# Patient Record
Sex: MALE | Race: WHITE | NOT HISPANIC OR LATINO
[De-identification: names, ages, dates, MRNs, and addresses within clinical notes are randomized per-mention and may not be internally consistent; named-entity substitution may affect disease eponyms.]

---

## 2020-08-19 ENCOUNTER — TRANSCRIPTION ENCOUNTER (OUTPATIENT)
Age: 40
End: 2020-08-19

## 2020-08-23 ENCOUNTER — TRANSCRIPTION ENCOUNTER (OUTPATIENT)
Age: 40
End: 2020-08-23

## 2021-04-22 ENCOUNTER — TRANSCRIPTION ENCOUNTER (OUTPATIENT)
Age: 41
End: 2021-04-22

## 2021-04-22 ENCOUNTER — EMERGENCY (EMERGENCY)
Facility: HOSPITAL | Age: 41
LOS: 0 days | Discharge: HOME | End: 2021-04-22
Attending: STUDENT IN AN ORGANIZED HEALTH CARE EDUCATION/TRAINING PROGRAM
Payer: COMMERCIAL

## 2021-04-22 VITALS
SYSTOLIC BLOOD PRESSURE: 139 MMHG | DIASTOLIC BLOOD PRESSURE: 94 MMHG | HEART RATE: 76 BPM | RESPIRATION RATE: 16 BRPM | OXYGEN SATURATION: 98 % | TEMPERATURE: 98 F

## 2021-04-22 VITALS
HEART RATE: 95 BPM | WEIGHT: 214.95 LBS | TEMPERATURE: 99 F | DIASTOLIC BLOOD PRESSURE: 99 MMHG | HEIGHT: 71 IN | OXYGEN SATURATION: 96 % | RESPIRATION RATE: 14 BRPM | SYSTOLIC BLOOD PRESSURE: 141 MMHG

## 2021-04-22 DIAGNOSIS — R07.89 OTHER CHEST PAIN: ICD-10-CM

## 2021-04-22 DIAGNOSIS — Z88.0 ALLERGY STATUS TO PENICILLIN: ICD-10-CM

## 2021-04-22 LAB
ALBUMIN SERPL ELPH-MCNC: 4.2 G/DL — SIGNIFICANT CHANGE UP (ref 3.3–5)
ALP SERPL-CCNC: 79 U/L — SIGNIFICANT CHANGE UP (ref 40–120)
ALT FLD-CCNC: 41 U/L — SIGNIFICANT CHANGE UP (ref 12–78)
ANION GAP SERPL CALC-SCNC: 4 MMOL/L — LOW (ref 5–17)
APTT BLD: 29.7 SEC — SIGNIFICANT CHANGE UP (ref 27.5–35.5)
AST SERPL-CCNC: 28 U/L — SIGNIFICANT CHANGE UP (ref 15–37)
BASOPHILS # BLD AUTO: 0.05 K/UL — SIGNIFICANT CHANGE UP (ref 0–0.2)
BASOPHILS NFR BLD AUTO: 0.8 % — SIGNIFICANT CHANGE UP (ref 0–2)
BILIRUB SERPL-MCNC: 0.4 MG/DL — SIGNIFICANT CHANGE UP (ref 0.2–1.2)
BUN SERPL-MCNC: 20 MG/DL — SIGNIFICANT CHANGE UP (ref 7–23)
CALCIUM SERPL-MCNC: 8.7 MG/DL — SIGNIFICANT CHANGE UP (ref 8.5–10.1)
CHLORIDE SERPL-SCNC: 107 MMOL/L — SIGNIFICANT CHANGE UP (ref 96–108)
CO2 SERPL-SCNC: 29 MMOL/L — SIGNIFICANT CHANGE UP (ref 22–31)
CREAT SERPL-MCNC: 1.1 MG/DL — SIGNIFICANT CHANGE UP (ref 0.5–1.3)
EOSINOPHIL # BLD AUTO: 0.13 K/UL — SIGNIFICANT CHANGE UP (ref 0–0.5)
EOSINOPHIL NFR BLD AUTO: 2 % — SIGNIFICANT CHANGE UP (ref 0–6)
GLUCOSE SERPL-MCNC: 110 MG/DL — HIGH (ref 70–99)
HCT VFR BLD CALC: 45.6 % — SIGNIFICANT CHANGE UP (ref 39–50)
HGB BLD-MCNC: 14.9 G/DL — SIGNIFICANT CHANGE UP (ref 13–17)
IMM GRANULOCYTES NFR BLD AUTO: 0.2 % — SIGNIFICANT CHANGE UP (ref 0–1.5)
INR BLD: 0.93 RATIO — SIGNIFICANT CHANGE UP (ref 0.88–1.16)
LYMPHOCYTES # BLD AUTO: 1.84 K/UL — SIGNIFICANT CHANGE UP (ref 1–3.3)
LYMPHOCYTES # BLD AUTO: 27.6 % — SIGNIFICANT CHANGE UP (ref 13–44)
MAGNESIUM SERPL-MCNC: 2 MG/DL — SIGNIFICANT CHANGE UP (ref 1.6–2.6)
MCHC RBC-ENTMCNC: 30.8 PG — SIGNIFICANT CHANGE UP (ref 27–34)
MCHC RBC-ENTMCNC: 32.7 GM/DL — SIGNIFICANT CHANGE UP (ref 32–36)
MCV RBC AUTO: 94.2 FL — SIGNIFICANT CHANGE UP (ref 80–100)
MONOCYTES # BLD AUTO: 0.6 K/UL — SIGNIFICANT CHANGE UP (ref 0–0.9)
MONOCYTES NFR BLD AUTO: 9 % — SIGNIFICANT CHANGE UP (ref 2–14)
NEUTROPHILS # BLD AUTO: 4.03 K/UL — SIGNIFICANT CHANGE UP (ref 1.8–7.4)
NEUTROPHILS NFR BLD AUTO: 60.4 % — SIGNIFICANT CHANGE UP (ref 43–77)
PLATELET # BLD AUTO: 248 K/UL — SIGNIFICANT CHANGE UP (ref 150–400)
POTASSIUM SERPL-MCNC: 4 MMOL/L — SIGNIFICANT CHANGE UP (ref 3.5–5.3)
POTASSIUM SERPL-SCNC: 4 MMOL/L — SIGNIFICANT CHANGE UP (ref 3.5–5.3)
PROT SERPL-MCNC: 7.6 GM/DL — SIGNIFICANT CHANGE UP (ref 6–8.3)
PROTHROM AB SERPL-ACNC: 10.8 SEC — SIGNIFICANT CHANGE UP (ref 10.6–13.6)
RBC # BLD: 4.84 M/UL — SIGNIFICANT CHANGE UP (ref 4.2–5.8)
RBC # FLD: 12.5 % — SIGNIFICANT CHANGE UP (ref 10.3–14.5)
SODIUM SERPL-SCNC: 140 MMOL/L — SIGNIFICANT CHANGE UP (ref 135–145)
TROPONIN I SERPL-MCNC: <0.015 NG/ML — SIGNIFICANT CHANGE UP (ref 0.01–0.04)
TSH SERPL-MCNC: 1.02 UU/ML — SIGNIFICANT CHANGE UP (ref 0.34–4.82)
WBC # BLD: 6.66 K/UL — SIGNIFICANT CHANGE UP (ref 3.8–10.5)
WBC # FLD AUTO: 6.66 K/UL — SIGNIFICANT CHANGE UP (ref 3.8–10.5)

## 2021-04-22 PROCEDURE — 80053 COMPREHEN METABOLIC PANEL: CPT

## 2021-04-22 PROCEDURE — 85610 PROTHROMBIN TIME: CPT

## 2021-04-22 PROCEDURE — 99285 EMERGENCY DEPT VISIT HI MDM: CPT

## 2021-04-22 PROCEDURE — 71045 X-RAY EXAM CHEST 1 VIEW: CPT

## 2021-04-22 PROCEDURE — 99284 EMERGENCY DEPT VISIT MOD MDM: CPT | Mod: 25

## 2021-04-22 PROCEDURE — 93005 ELECTROCARDIOGRAM TRACING: CPT

## 2021-04-22 PROCEDURE — 93010 ELECTROCARDIOGRAM REPORT: CPT

## 2021-04-22 PROCEDURE — 84443 ASSAY THYROID STIM HORMONE: CPT

## 2021-04-22 PROCEDURE — 71045 X-RAY EXAM CHEST 1 VIEW: CPT | Mod: 26

## 2021-04-22 PROCEDURE — 36415 COLL VENOUS BLD VENIPUNCTURE: CPT

## 2021-04-22 PROCEDURE — 85730 THROMBOPLASTIN TIME PARTIAL: CPT

## 2021-04-22 PROCEDURE — 85025 COMPLETE CBC W/AUTO DIFF WBC: CPT

## 2021-04-22 PROCEDURE — 83735 ASSAY OF MAGNESIUM: CPT

## 2021-04-22 PROCEDURE — 84484 ASSAY OF TROPONIN QUANT: CPT

## 2021-04-22 NOTE — ED STATDOCS - PROGRESS NOTE DETAILS
Patient seen and evaluated s/p workup, no acute findings concerning for acs, pain for greater than 2 weeks.  Reviewed with patient return precautions, PMD f/u, cardiology follow up -Anson Godoy PA-C

## 2021-04-22 NOTE — ED STATDOCS - PATIENT PORTAL LINK FT
You can access the FollowMyHealth Patient Portal offered by [facility] by registering at the following website: [email or web address]. By joining [email or web address]’s FollowMyHealth portal, you will also be able to view your health information using other applications (apps) compatible with our system.

## 2021-04-22 NOTE — ED ADULT NURSE NOTE - OBJECTIVE STATEMENT
pt presents to the ED with chest pain/tightness for about 1 week. reports occasional SOB. denies fevers, chills, nausea, vomiting.

## 2021-04-22 NOTE — ED STATDOCS - OBJECTIVE STATEMENT
42 y/o male with no significant PMHx presents to the ED c/o 1 month of constant substernal chest pain. Pt states when bends over and lifts head up chest pain worsens with heart racing sensation. Notes symptoms worsening x1 week, went to urgent care today and was sent to the ED for further eval. No Hx of cardiac disease. No other complaints at this time.

## 2021-04-22 NOTE — ED ADULT TRIAGE NOTE - CHIEF COMPLAINT QUOTE
c/o chest pain, sob, rapid heart rate when bending over for past 2 weeks, went to urgent care today and sent to ER, denies sick contact, fever or cough HX: denies

## 2021-04-22 NOTE — ED STATDOCS - CARE PROVIDER_API CALL
Cody Belle (DO)  Cardiology  [address] [place] [address]  Phone: [phone]  Fax: [phone]  Follow Up Time:

## 2021-04-22 NOTE — ED STATDOCS - ATTENDING CONTRIBUTION TO CARE
I, Trinidad Blair DO,  performed the initial face to face bedside interview with this patient regarding history of present illness, review of symptoms and relevant past medical, social and family history.  I completed an independent physical examination.  I was the initial provider who evaluated this patient. I have signed out the follow up of any pending tests (i.e. labs, radiological studies) to the ACP.  I have communicated the patient’s plan of care and disposition with the ACP.  The history, relevant review of systems, past medical and surgical history, medical decision making, and physical examination was documented by the scribe in my presence and I attest to the accuracy of the documentation.

## 2021-04-23 ENCOUNTER — NON-APPOINTMENT (OUTPATIENT)
Age: 41
End: 2021-04-23

## 2021-04-23 DIAGNOSIS — Z83.3 FAMILY HISTORY OF DIABETES MELLITUS: ICD-10-CM

## 2021-04-23 DIAGNOSIS — Z82.49 FAMILY HISTORY OF ISCHEMIC HEART DISEASE AND OTHER DISEASES OF THE CIRCULATORY SYSTEM: ICD-10-CM

## 2021-04-23 DIAGNOSIS — Z78.9 OTHER SPECIFIED HEALTH STATUS: ICD-10-CM

## 2021-04-23 DIAGNOSIS — R10.32 LEFT LOWER QUADRANT PAIN: ICD-10-CM

## 2021-04-23 DIAGNOSIS — Z83.49 FAMILY HISTORY OF OTHER ENDOCRINE, NUTRITIONAL AND METABOLIC DISEASES: ICD-10-CM

## 2021-04-23 DIAGNOSIS — J06.9 ACUTE UPPER RESPIRATORY INFECTION, UNSPECIFIED: ICD-10-CM

## 2021-04-29 ENCOUNTER — APPOINTMENT (OUTPATIENT)
Dept: FAMILY MEDICINE | Facility: CLINIC | Age: 41
End: 2021-04-29
Payer: COMMERCIAL

## 2021-04-29 VITALS
DIASTOLIC BLOOD PRESSURE: 90 MMHG | OXYGEN SATURATION: 98 % | TEMPERATURE: 97 F | HEART RATE: 80 BPM | WEIGHT: 215 LBS | SYSTOLIC BLOOD PRESSURE: 138 MMHG | BODY MASS INDEX: 30.1 KG/M2 | HEIGHT: 71 IN

## 2021-04-29 DIAGNOSIS — Z00.00 ENCOUNTER FOR GENERAL ADULT MEDICAL EXAMINATION W/OUT ABNORMAL FINDINGS: ICD-10-CM

## 2021-04-29 PROCEDURE — 36415 COLL VENOUS BLD VENIPUNCTURE: CPT

## 2021-04-29 PROCEDURE — 99396 PREV VISIT EST AGE 40-64: CPT | Mod: 25

## 2021-04-29 PROCEDURE — 99072 ADDL SUPL MATRL&STAF TM PHE: CPT

## 2021-04-29 NOTE — PLAN
[FreeTextEntry1] : Patient is advised to continue with his current diet and exercise, along with his current rx management. He  is also advised to make sure that he follows up with the ophthalmologist as he has not since his surgery in 2015, and dentist annually, also the dermatologist for routine skin checks, which he is now due for.  He is also advised to make sure that he  exercises when possible, and follows up for any medical issues that arise. His labs will be checked as noted above.\par \par ECG declined today.

## 2021-04-29 NOTE — HEALTH RISK ASSESSMENT
[With Family] : lives with family [Employed] : employed [College] : College [] :  [Sexually Active] : sexually active [Smoke Detector] : smoke detector [Carbon Monoxide Detector] : carbon monoxide detector [Safety elements used in home] : safety elements used in home [Seat Belt] :  uses seat belt [Sunscreen] : uses sunscreen [# Of Children ___] : has [unfilled] children [Feels Safe at Home] : Feels safe at home [Change in mental status noted] : No change in mental status noted [High Risk Behavior] : no high risk behavior [Reports changes in hearing] : Reports no changes in hearing [Reports changes in vision] : Reports no changes in vision [Reports changes in dental health] : Reports no changes in dental health [Guns at Home] : no guns at home [Travel to Developing Areas] : does not  travel to developing areas [TB Exposure] : is not being exposed to tuberculosis [Caregiver Concerns] : does not have caregiver concerns [ColonoscopyDate] : never [FreeTextEntry2] :

## 2021-04-29 NOTE — HISTORY OF PRESENT ILLNESS
[FreeTextEntry1] : routine PE [de-identified] : Patient is currently utd with his Tdap, and is due in 2024.  He is also utd with the dentist, and he has not followed up with ophtho since he had LASIK surgery done.  His last skin check was last year, but will be rescheduling this for the coming year. He does follow a normal diet, and he does exercise when possible.  He feels that his metabolism has slowed, and he would like to his his thyroid evaluated.

## 2021-05-03 LAB
ALBUMIN SERPL ELPH-MCNC: 4.8 G/DL
ALP BLD-CCNC: 64 U/L
ALT SERPL-CCNC: 25 U/L
ANION GAP SERPL CALC-SCNC: 11 MMOL/L
AST SERPL-CCNC: 23 U/L
BILIRUB SERPL-MCNC: 0.7 MG/DL
BUN SERPL-MCNC: 15 MG/DL
CALCIUM SERPL-MCNC: 9.7 MG/DL
CHLORIDE SERPL-SCNC: 106 MMOL/L
CHOLEST SERPL-MCNC: 197 MG/DL
CO2 SERPL-SCNC: 24 MMOL/L
CREAT SERPL-MCNC: 0.99 MG/DL
ESTIMATED AVERAGE GLUCOSE: 103 MG/DL
GLUCOSE SERPL-MCNC: 91 MG/DL
HBA1C MFR BLD HPLC: 5.2 %
HDLC SERPL-MCNC: 55 MG/DL
LDLC SERPL CALC-MCNC: 107 MG/DL
MAGNESIUM SERPL-MCNC: 2.2 MG/DL
NONHDLC SERPL-MCNC: 141 MG/DL
POTASSIUM SERPL-SCNC: 4.3 MMOL/L
PROT SERPL-MCNC: 7.2 G/DL
SODIUM SERPL-SCNC: 141 MMOL/L
TRIGL SERPL-MCNC: 171 MG/DL
TSH SERPL-ACNC: 0.65 UIU/ML

## 2022-01-25 PROBLEM — Z78.9 OTHER SPECIFIED HEALTH STATUS: Chronic | Status: ACTIVE | Noted: 2021-04-22

## 2022-05-27 ENCOUNTER — APPOINTMENT (OUTPATIENT)
Dept: FAMILY MEDICINE | Facility: CLINIC | Age: 42
End: 2022-05-27
Payer: COMMERCIAL

## 2022-05-27 ENCOUNTER — NON-APPOINTMENT (OUTPATIENT)
Age: 42
End: 2022-05-27

## 2022-05-27 VITALS
OXYGEN SATURATION: 98 % | SYSTOLIC BLOOD PRESSURE: 110 MMHG | BODY MASS INDEX: 32.06 KG/M2 | HEART RATE: 74 BPM | TEMPERATURE: 97.8 F | WEIGHT: 229 LBS | HEIGHT: 71 IN | DIASTOLIC BLOOD PRESSURE: 82 MMHG

## 2022-05-27 PROCEDURE — 36415 COLL VENOUS BLD VENIPUNCTURE: CPT

## 2022-05-27 PROCEDURE — 93000 ELECTROCARDIOGRAM COMPLETE: CPT

## 2022-05-27 PROCEDURE — 99396 PREV VISIT EST AGE 40-64: CPT | Mod: 25

## 2022-05-27 NOTE — PLAN
[FreeTextEntry1] : Check labs as above. \par \par Reviewed age-appropriate preventive screening tests with patient.\par \par Discussed clean eating (e.g. Mediterranean style diet) and recommendations for regular exercise/staying as physically active as possible.\par \par Reviewed importance of good self care (e.g. meditation, yoga, adequate rest, regular exercise, magnesium, clean eating, etc.).\par \par Follow up for next physical in one year.\par

## 2022-05-27 NOTE — HISTORY OF PRESENT ILLNESS
[FreeTextEntry1] : RAISSA CHEEK is a 42 year old male here for a physical exam.  [de-identified] : His last physical exam was last year\par \par Vaccines:\par Tetanus is up to date\par COVID vaccine is up to date\par \par His last dentist visit was less than one year ago\par His last eye doctor appointment was several years ago\par His last dermatologist visit was 2 years ago\par \par Colon cancer screening is indicated at age 45\par \par His diet is healthy overall\par Exercise: rarely

## 2022-05-27 NOTE — ASSESSMENT
[FreeTextEntry1] : RAISSA CHEEK is a 42 year old male here for a physical exam. \par \par His EKG is within normal limits. \par \par He had mildly elevated triglycerides at his physical last year. He had requested blood work to test his thyroid because he felt that his metabolism had slowed down. The labs were normal.\par \par He has gained weight since last year. He admits that he has been less active.\par \par He has a deviated septum and would like a referral to ENT.\par

## 2022-05-27 NOTE — HEALTH RISK ASSESSMENT
[0] : 2) Feeling down, depressed, or hopeless: Not at all (0) [PHQ-2 Negative - No further assessment needed] : PHQ-2 Negative - No further assessment needed [[id number]] : 0

## 2022-05-28 LAB
ALBUMIN SERPL ELPH-MCNC: 4.8 G/DL
ALP BLD-CCNC: 68 U/L
ALT SERPL-CCNC: 20 U/L
ANION GAP SERPL CALC-SCNC: 13 MMOL/L
AST SERPL-CCNC: 23 U/L
BILIRUB SERPL-MCNC: 0.5 MG/DL
BUN SERPL-MCNC: 19 MG/DL
CALCIUM SERPL-MCNC: 10 MG/DL
CHLORIDE SERPL-SCNC: 104 MMOL/L
CHOLEST SERPL-MCNC: 191 MG/DL
CO2 SERPL-SCNC: 23 MMOL/L
CREAT SERPL-MCNC: 1.1 MG/DL
EGFR: 85 ML/MIN/1.73M2
GLUCOSE SERPL-MCNC: 90 MG/DL
HDLC SERPL-MCNC: 54 MG/DL
LDLC SERPL CALC-MCNC: 123 MG/DL
NONHDLC SERPL-MCNC: 137 MG/DL
POTASSIUM SERPL-SCNC: 4.7 MMOL/L
PROT SERPL-MCNC: 7.2 G/DL
SODIUM SERPL-SCNC: 141 MMOL/L
TRIGL SERPL-MCNC: 74 MG/DL

## 2022-06-03 ENCOUNTER — NON-APPOINTMENT (OUTPATIENT)
Age: 42
End: 2022-06-03

## 2022-07-04 ENCOUNTER — EMERGENCY (EMERGENCY)
Facility: HOSPITAL | Age: 42
LOS: 0 days | Discharge: HOME | End: 2022-07-04
Attending: EMERGENCY MEDICINE
Payer: COMMERCIAL

## 2022-07-04 VITALS
TEMPERATURE: 98 F | DIASTOLIC BLOOD PRESSURE: 103 MMHG | SYSTOLIC BLOOD PRESSURE: 142 MMHG | OXYGEN SATURATION: 97 % | RESPIRATION RATE: 16 BRPM | HEART RATE: 77 BPM

## 2022-07-04 VITALS — WEIGHT: 72.09 LBS | HEIGHT: 71 IN
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
## 2022-07-04 DIAGNOSIS — S99.922A UNSPECIFIED INJURY OF LEFT FOOT, INITIAL ENCOUNTER: ICD-10-CM

## 2022-07-04 DIAGNOSIS — Z88.0 ALLERGY STATUS TO PENICILLIN: ICD-10-CM
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
## 2022-07-04 PROCEDURE — 73630 X-RAY EXAM OF FOOT: CPT | Mod: 26,LT

## 2022-07-04 PROCEDURE — 73630 X-RAY EXAM OF FOOT: CPT | Mod: LT

## 2022-07-04 PROCEDURE — 99283 EMERGENCY DEPT VISIT LOW MDM: CPT

## 2022-07-04 PROCEDURE — 99283 EMERGENCY DEPT VISIT LOW MDM: CPT | Mod: 25

## 2022-07-04 NOTE — ED STATDOCS - ATTENDING APP SHARED VISIT CONTRIBUTION OF CARE
I,Brown Sharma MD,  performed the initial face to face bedside interview with this patient regarding history of present illness, review of symptoms and relevant past medical, social and family history.  I completed an independent physical examination.  I was the initial provider who evaluated this patient. I have signed out the follow up of any pending tests (i.e. labs, radiological studies) to the ACP.  I have communicated the patient’s plan of care and disposition with the ACP.  The history, relevant review of systems, past medical and surgical history, medical decision making, and physical examination was documented by the scribe in my presence and I attest to the accuracy of the documentation.

## 2022-07-04 NOTE — ED STATDOCS - OBJECTIVE STATEMENT
43 y/o male presents to the ED s/p left foot injury. Pt states he injured his foot last night while walking up the stairs. Pt took Tylenol last night. No other complaints at this time.

## 2022-07-04 NOTE — ED STATDOCS - PROGRESS NOTE DETAILS
XR negative for fx, will d/c home, advised NUZHAT, outpt PMD f/u pt agreeable to d/c and plan of care  Sally Marte PA-C Patient seen and evaluated, ED attending note and orders reviewed, will continue with patient follow up and care -Sally Marte PA-C

## 2022-07-04 NOTE — ED STATDOCS - MUSCULOSKELETAL, MLM
range of motion is not limited. Tenderness to plantar aspect of left foot. Tenderness to base of 2nd and 3rd toes.

## 2022-07-04 NOTE — ED STATDOCS - NS ED ATTENDING STATEMENT MOD
This was a shared visit with the LOTTIE. I reviewed and verified the documentation and independently performed the documented:

## 2022-07-04 NOTE — ED STATDOCS - PATIENT PORTAL LINK FT
You can access the FollowMyHealth Patient Portal offered by [facility] by registering at the following website: [email or web address]. By joining [employer]’s FollowMyHealth portal, you will also be able to view your health information using other applications (apps) compatible with our system.

## 2022-07-04 NOTE — ED ADULT TRIAGE NOTE - CHIEF COMPLAINT QUOTE
pt presents to ED due to left foot pain pt injured last night when walking up the stairs denies fall pain on top of foot

## 2022-11-17 ENCOUNTER — NON-APPOINTMENT (OUTPATIENT)
Age: 42
End: 2022-11-17

## 2022-11-17 ENCOUNTER — APPOINTMENT (OUTPATIENT)
Dept: FAMILY MEDICINE | Facility: CLINIC | Age: 42
End: 2022-11-17

## 2022-11-17 VITALS
BODY MASS INDEX: 31.64 KG/M2 | RESPIRATION RATE: 16 BRPM | SYSTOLIC BLOOD PRESSURE: 120 MMHG | TEMPERATURE: 98.3 F | OXYGEN SATURATION: 99 % | DIASTOLIC BLOOD PRESSURE: 92 MMHG | HEIGHT: 71 IN | HEART RATE: 72 BPM | WEIGHT: 226 LBS

## 2022-11-17 DIAGNOSIS — Z01.818 ENCOUNTER FOR OTHER PREPROCEDURAL EXAMINATION: ICD-10-CM

## 2022-11-17 PROCEDURE — 99213 OFFICE O/P EST LOW 20 MIN: CPT

## 2022-11-17 NOTE — PHYSICAL EXAM
[No Acute Distress] : no acute distress [Well Nourished] : well nourished [Well Developed] : well developed [Well-Appearing] : well-appearing [Normal Voice/Communication] : normal voice/communication [Normal Sclera/Conjunctiva] : normal sclera/conjunctiva [Normal Oropharynx] : the oropharynx was normal [Supple] : supple [No Respiratory Distress] : no respiratory distress  [Clear to Auscultation] : lungs were clear to auscultation bilaterally [Normal Rate] : normal rate  [Normal S1, S2] : normal S1 and S2 [Soft] : abdomen soft [Non Tender] : non-tender [Normal Bowel Sounds] : normal bowel sounds [Speech Grossly Normal] : speech grossly normal [Normal Affect] : the affect was normal [Normal Mood] : the mood was normal

## 2022-11-17 NOTE — PLAN
[FreeTextEntry1] : 42-year-old male for preop visit.  Patient is scheduled for discectomy with Dr. Lopez on November 21 at [facility].  METs greater than 4.  EKG, chest x-ray, labs reviewed.  Patient is deemed medically optimized for upcoming procedure.  All questions answered.  Patient voiced understanding and in agreement to above plan.

## 2022-11-17 NOTE — HISTORY OF PRESENT ILLNESS
[FreeTextEntry1] : Preop [de-identified] : 41 y/o male presents for medical clearance. He is having back surgery on 11/21/22 at [facility] with Dr Lopez.  Patient denies any issues with anesthesia or bleeding in the past.  Is able to climb at least 1 flight of stairs without getting short of breath.

## 2022-11-20 ENCOUNTER — FORM ENCOUNTER (OUTPATIENT)
Age: 42
End: 2022-11-20

## 2023-05-15 ENCOUNTER — APPOINTMENT (OUTPATIENT)
Dept: FAMILY MEDICINE | Facility: CLINIC | Age: 43
End: 2023-05-15
Payer: COMMERCIAL

## 2023-05-15 VITALS
BODY MASS INDEX: 31.64 KG/M2 | HEART RATE: 85 BPM | WEIGHT: 226 LBS | TEMPERATURE: 97 F | DIASTOLIC BLOOD PRESSURE: 88 MMHG | HEIGHT: 71 IN | SYSTOLIC BLOOD PRESSURE: 142 MMHG | OXYGEN SATURATION: 97 %

## 2023-05-15 DIAGNOSIS — J01.90 ACUTE SINUSITIS, UNSPECIFIED: ICD-10-CM

## 2023-05-15 PROCEDURE — 99213 OFFICE O/P EST LOW 20 MIN: CPT

## 2023-05-15 NOTE — PLAN
[FreeTextEntry1] : The majority of sinus infections are caused by viruses and respond to supportive care and do not require use of antibiotics. Recommend treating sinus symptoms with OTC medications including decongestants (Sudafed or Coricidin), mucolytics (Mucinex or Robitussin), nasal saline spray or Neti pot, and Tylenol or ibuprofen for pain and fever. \par \par Also recommend use of nasal steroid sprays (i.e. Flonase). Afrin (OTC decongestant spray) used SPARINGLY (not for more than 2-3 days in a row) can help decrease nasal congestion and help sinuses to drain.  \par \par Due to his history of frequent sinus infections and a deviated septum will prescribe antibiotics at this time as well. He is allergic to penicillin but has taking Doxycycline in the past.\par

## 2023-05-15 NOTE — HEALTH RISK ASSESSMENT
[0] : 2) Feeling down, depressed, or hopeless: Not at all (0) [PHQ-2 Negative - No further assessment needed] : PHQ-2 Negative - No further assessment needed [Never] : Never [[id number]] : 0

## 2023-05-15 NOTE — HISTORY OF PRESENT ILLNESS
[FreeTextEntry8] : Patient presents with sinus symptoms. The symptoms started a few days ago with sinus congestion, mucus, and a sore throat. He has no fever. He denies a history of allergies. His son has similar symptoms, though his started afterwards. He has been using Flonase but no other OTC medications. He has a history of frequent sinus infections.

## 2023-05-24 ENCOUNTER — EMERGENCY (EMERGENCY)
Facility: HOSPITAL | Age: 43
LOS: 0 days | Discharge: HOME | End: 2023-05-24
Attending: EMERGENCY MEDICINE
Payer: COMMERCIAL

## 2023-05-24 VITALS
RESPIRATION RATE: 18 BRPM | DIASTOLIC BLOOD PRESSURE: 84 MMHG | OXYGEN SATURATION: 98 % | SYSTOLIC BLOOD PRESSURE: 132 MMHG | TEMPERATURE: 98 F | HEART RATE: 81 BPM

## 2023-05-24 VITALS — WEIGHT: 236.56 LBS | HEIGHT: 71 IN

## 2023-05-24 DIAGNOSIS — R20.0 ANESTHESIA OF SKIN: ICD-10-CM

## 2023-05-24 DIAGNOSIS — Z88.0 ALLERGY STATUS TO PENICILLIN: ICD-10-CM

## 2023-05-24 LAB
ALBUMIN SERPL ELPH-MCNC: 3.9 G/DL — SIGNIFICANT CHANGE UP (ref 3.3–5)
ALP SERPL-CCNC: 78 U/L — SIGNIFICANT CHANGE UP (ref 40–120)
ALT FLD-CCNC: 37 U/L — SIGNIFICANT CHANGE UP (ref 12–78)
ANION GAP SERPL CALC-SCNC: 6 MMOL/L — SIGNIFICANT CHANGE UP (ref 5–17)
APTT BLD: 29 SEC — SIGNIFICANT CHANGE UP (ref 27.5–35.5)
AST SERPL-CCNC: 27 U/L — SIGNIFICANT CHANGE UP (ref 15–37)
BASOPHILS # BLD AUTO: 0.06 K/UL — SIGNIFICANT CHANGE UP (ref 0–0.2)
BASOPHILS NFR BLD AUTO: 0.8 % — SIGNIFICANT CHANGE UP (ref 0–2)
BILIRUB SERPL-MCNC: 0.4 MG/DL — SIGNIFICANT CHANGE UP (ref 0.2–1.2)
BUN SERPL-MCNC: 20 MG/DL — SIGNIFICANT CHANGE UP (ref 7–23)
CALCIUM SERPL-MCNC: 8.9 MG/DL — SIGNIFICANT CHANGE UP (ref 8.5–10.1)
CHLORIDE SERPL-SCNC: 110 MMOL/L — HIGH (ref 96–108)
CO2 SERPL-SCNC: 25 MMOL/L — SIGNIFICANT CHANGE UP (ref 22–31)
CREAT SERPL-MCNC: 1.12 MG/DL — SIGNIFICANT CHANGE UP (ref 0.5–1.3)
EGFR: 83 ML/MIN/1.73M2 — SIGNIFICANT CHANGE UP
EOSINOPHIL # BLD AUTO: 0.17 K/UL — SIGNIFICANT CHANGE UP (ref 0–0.5)
EOSINOPHIL NFR BLD AUTO: 2.2 % — SIGNIFICANT CHANGE UP (ref 0–6)
GLUCOSE SERPL-MCNC: 100 MG/DL — HIGH (ref 70–99)
HCT VFR BLD CALC: 42.9 % — SIGNIFICANT CHANGE UP (ref 39–50)
HGB BLD-MCNC: 14.7 G/DL — SIGNIFICANT CHANGE UP (ref 13–17)
IMM GRANULOCYTES NFR BLD AUTO: 0.3 % — SIGNIFICANT CHANGE UP (ref 0–0.9)
INR BLD: 0.97 RATIO — SIGNIFICANT CHANGE UP (ref 0.88–1.16)
LYMPHOCYTES # BLD AUTO: 2.35 K/UL — SIGNIFICANT CHANGE UP (ref 1–3.3)
LYMPHOCYTES # BLD AUTO: 30 % — SIGNIFICANT CHANGE UP (ref 13–44)
MCHC RBC-ENTMCNC: 31.3 PG — SIGNIFICANT CHANGE UP (ref 27–34)
MCHC RBC-ENTMCNC: 34.3 GM/DL — SIGNIFICANT CHANGE UP (ref 32–36)
MCV RBC AUTO: 91.5 FL — SIGNIFICANT CHANGE UP (ref 80–100)
MONOCYTES # BLD AUTO: 0.7 K/UL — SIGNIFICANT CHANGE UP (ref 0–0.9)
MONOCYTES NFR BLD AUTO: 8.9 % — SIGNIFICANT CHANGE UP (ref 2–14)
NEUTROPHILS # BLD AUTO: 4.54 K/UL — SIGNIFICANT CHANGE UP (ref 1.8–7.4)
NEUTROPHILS NFR BLD AUTO: 57.8 % — SIGNIFICANT CHANGE UP (ref 43–77)
PLATELET # BLD AUTO: 285 K/UL — SIGNIFICANT CHANGE UP (ref 150–400)
POTASSIUM SERPL-MCNC: 4.1 MMOL/L — SIGNIFICANT CHANGE UP (ref 3.5–5.3)
POTASSIUM SERPL-SCNC: 4.1 MMOL/L — SIGNIFICANT CHANGE UP (ref 3.5–5.3)
PROT SERPL-MCNC: 7.3 GM/DL — SIGNIFICANT CHANGE UP (ref 6–8.3)
PROTHROM AB SERPL-ACNC: 11.2 SEC — SIGNIFICANT CHANGE UP (ref 10.5–13.4)
RBC # BLD: 4.69 M/UL — SIGNIFICANT CHANGE UP (ref 4.2–5.8)
RBC # FLD: 12.4 % — SIGNIFICANT CHANGE UP (ref 10.3–14.5)
SODIUM SERPL-SCNC: 141 MMOL/L — SIGNIFICANT CHANGE UP (ref 135–145)
TROPONIN I, HIGH SENSITIVITY RESULT: 3.57 NG/L — SIGNIFICANT CHANGE UP
WBC # BLD: 7.84 K/UL — SIGNIFICANT CHANGE UP (ref 3.8–10.5)
WBC # FLD AUTO: 7.84 K/UL — SIGNIFICANT CHANGE UP (ref 3.8–10.5)

## 2023-05-24 PROCEDURE — 93005 ELECTROCARDIOGRAM TRACING: CPT

## 2023-05-24 PROCEDURE — 99285 EMERGENCY DEPT VISIT HI MDM: CPT

## 2023-05-24 PROCEDURE — 70498 CT ANGIOGRAPHY NECK: CPT | Mod: MA

## 2023-05-24 PROCEDURE — 84484 ASSAY OF TROPONIN QUANT: CPT

## 2023-05-24 PROCEDURE — 70496 CT ANGIOGRAPHY HEAD: CPT | Mod: MA

## 2023-05-24 PROCEDURE — 0042T: CPT | Mod: MA

## 2023-05-24 PROCEDURE — 70450 CT HEAD/BRAIN W/O DYE: CPT | Mod: MA

## 2023-05-24 PROCEDURE — 82962 GLUCOSE BLOOD TEST: CPT

## 2023-05-24 PROCEDURE — 80053 COMPREHEN METABOLIC PANEL: CPT

## 2023-05-24 PROCEDURE — 85730 THROMBOPLASTIN TIME PARTIAL: CPT

## 2023-05-24 PROCEDURE — 85025 COMPLETE CBC W/AUTO DIFF WBC: CPT

## 2023-05-24 PROCEDURE — 85610 PROTHROMBIN TIME: CPT

## 2023-05-24 PROCEDURE — 36415 COLL VENOUS BLD VENIPUNCTURE: CPT

## 2023-05-24 PROCEDURE — 70496 CT ANGIOGRAPHY HEAD: CPT | Mod: 26,MA

## 2023-05-24 PROCEDURE — 70498 CT ANGIOGRAPHY NECK: CPT | Mod: 26,MA

## 2023-05-24 PROCEDURE — 99285 EMERGENCY DEPT VISIT HI MDM: CPT | Mod: 25

## 2023-05-24 PROCEDURE — 93010 ELECTROCARDIOGRAM REPORT: CPT

## 2023-05-24 NOTE — ED ADULT NURSE NOTE - CHIEF COMPLAINT QUOTE
Pt. presents to ED ambulatory c/o left  sided weakness. Pt. reports he began to feel left arm numbness this morning that traveled up his arm towards his head. Pt. evaluated by Dr Gonzalez in triage, Code stroke activated @2132. Robyn . [facility] 96 in triage. BEFAST positive for left sided numbness

## 2023-05-24 NOTE — ED ADULT NURSE REASSESSMENT NOTE - NS ED NURSE REASSESS COMMENT FT1
Heact CT negative, pt still c/o left side facial sensation- tingling, but left arm numbness, tingling improved. Pt denies any visual disturbances, denies headaches.  VSS, wife at bedside.

## 2023-05-24 NOTE — ED PROVIDER NOTE - CLINICAL SUMMARY MEDICAL DECISION MAKING FREE TEXT BOX
pt with left sided paresthesia since this morning outside of the window therapeutics, call code stroke will ct and reassess pt with left sided paresthesia since this morning outside of the window for thrombolytics , called code stroke, will ct and reassess    Javier Gonzalez MD symptoms persistent

## 2023-05-24 NOTE — ED PROVIDER NOTE - CARE PROVIDER_API CALL
Layne Grajeda  Neurology  [address], [place] [address]  Phone: [phone]  Fax: [phone]  Follow Up Time:

## 2023-05-24 NOTE — ED PROVIDER NOTE - PHYSICAL EXAMINATION
GEN - NAD; well appearing; A+O x3   HEAD - NC/AT     EYES - EOMI, no conjunctival pallor, no scleral icterus  ENT -   mucous membranes  moist , no discharge      NECK - Neck supple  PULM - CTA b/l,  symmetric breath sounds  COR -  RRR, S1 S2, no murmurs  ABD - , ND, NT, soft, no guarding, no rebound, no masses    BACK - no CVA tenderness, nontender spine     EXTREMS - no edema, no deformity, warm and well perfused    SKIN - no rash or bruising      NEUROLOGIC - A&Ox3, PERRLA, EOMI, no nystagmus, CN2-12 grossly intact, no pronator drift, normal finger to nose and rapid alternating finger movements, normal sensation and 5/5 strength in all extremities, normal gait NIH = 0

## 2023-05-24 NOTE — ED ADULT TRIAGE NOTE - CHIEF COMPLAINT QUOTE
Pt. presents to ED ambulatory c/o left  sided weakness. Pt. reports he began to feel left arm numbness this morning that traveled up his arm towards his head. Pt. evaluated by Dr Gonzalez in triage, Code stroke activated @2132. Robyn . [facility] 96 in triage. BEFAST positive for left sided numbness

## 2023-05-24 NOTE — ED PROVIDER NOTE - PATIENT PORTAL LINK FT
You can access the FollowMyHealth Patient Portal offered by [facility] by registering at the following website: [email or web address]. By joining [employer]’s FollowMyHealth portal, you will also be able to view your health information using other applications (apps) compatible with our system.

## 2023-05-24 NOTE — ED PROVIDER NOTE - NSFOLLOWUPINSTRUCTIONS_ED_ALL_ED_FT
Please take baby aspirin daily. Please follow up with neurology.       Paresthesia  Paresthesia is an abnormal burning or prickling sensation. It is usually felt in the hands, arms, legs, or feet. However, it may occur in any part of the body. Usually, paresthesia is not painful. It may feel like:  Tingling or numbness.  Buzzing.  Itching.  Paresthesia may occur without any clear cause, or it may be caused by:  Breathing too quickly (hyperventilation).  Pressure on a nerve.  An underlying medical condition.  Side effects of a medicine.  Nutritional deficiencies.  Exposure to toxic chemicals.  Most people experience temporary (transient) paresthesia at some time in their lives. For some people, it may be long-lasting (chronic) because of an underlying medical condition. If you have paresthesia that lasts a long time, you need to be evaluated by your health care provider.    Follow these instructions at home:  Nutrition    A plate with examples of foods in a healthy diet.  Eat a healthy diet. This includes:  Eating foods that are high in fiber, such as beans, whole grains, and fresh fruits and vegetables.  Limiting foods that are high in fat and processed sugars, such as fried or sweet foods.  Alcohol use    A sign showing that a person should not drink alcohol.  Avoid or limit alcohol. Too much alcohol can cause a vitamin B deficiency, and vitamin B is needed for healthy nerves.  Do not drink alcohol if:  Your health care provider tells you not to drink.  You are pregnant, may be pregnant, or are planning to become pregnant.  If you drink alcohol:  Limit how much you have to:  0–1 drink a day for women.  0–2 drinks a day for men.  Know how much alcohol is in your drink. In the [place] one drink equals one 12 oz bottle of beer (355 mL), one 5 oz glass of wine (148 mL), or one 1½ oz glass of hard liquor (44 mL).  General instructions    Take over-the-counter and prescription medicines only as told by your health care provider.  Do not use any products that contain nicotine or tobacco. These products include cigarettes, chewing tobacco, and vaping devices, such as e-cigarettes. If you need help quitting, ask your health care provider.  If you have diabetes, work closely with your health care provider to keep your blood sugar under control.  If you have numbness in your feet:  Check every day for signs of injury or infection. Watch for redness, warmth, and swelling.  Wear padded socks and comfortable shoes. These help protect your feet.  Keep all follow-up visits. This is important.  Contact a health care provider if you:  Have paresthesia that gets worse or does not go away.  Have numbness after an injury.  Have a burning or prickling feeling that gets worse when you walk.  Have pain, cramps, or dizziness, or you faint.  Develop a rash.  Get help right away if you:  Feel muscle weakness.  Develop new weakness in an arm or leg.  Have trouble walking or moving.  Have problems with speech, understanding, or vision.  Feel confused.  Cannot control your bladder or bowel movements.  These symptoms may be an emergency. Get help right away. Call 911.  Do not wait to see if the symptoms will go away.  Do not drive yourself to the hospital.  Summary  Paresthesia is an abnormal burning or prickling sensation that is usually felt in the hands, arms, legs, or feet. It may also occur in other parts of the body.  Paresthesia may occur without any clear cause, or it may be caused by breathing too quickly (hyperventilation), pressure on a nerve, an underlying medical condition, side effects of a medicine, nutritional deficiencies, or exposure to toxic chemicals.  If you have paresthesia that lasts a long time, you need to be evaluated by your health care provider.  This information is not intended to replace advice given to you by your health care provider. Make sure you discuss any questions you have with your health care provider.    Document Revised: 08/29/2022 Document Reviewed: 08/29/2022  Elsevier Patient Education © 2023 Elsevier Inc.

## 2023-05-24 NOTE — ED PROVIDER NOTE - CONSIDERATION OF ADMISSION OBSERVATION
10:44p symptoms persistent, offered admssion for possible stroke, pt would prefer to f/u w/ oupt neurology, will dc w/ return precautions. Consideration of Admission/Observation

## 2023-05-24 NOTE — ED PROVIDER NOTE - NS ED ROS FT
Gen: No fever, normal appetite  Eyes: No eye irritation or discharge  ENT: No ear pain, congestion, sore throat  Resp: No cough or trouble breathing  Cardiovascular: No chest pain or palpitation  Gastroenteric: No nausea/vomiting, diarrhea, constipation  :  No change in urine output; no dysuria  MS: No joint or muscle pain  Skin: No rashes  Neuro: No headache; no abnormal movements +numbness to left arm   Remainder negative, except as per the HPI

## 2023-05-24 NOTE — ED ADULT NURSE NOTE - NSFALLRISKINTERV_ED_ALL_ED

## 2023-05-24 NOTE — ED PROVIDER NOTE - OBJECTIVE STATEMENT
42 yo male presents to the ED c/o numbness (pins and needles) in the left arm radiating up to the left side of the face and eye starting this morning progressively worsening. Pt states he had a head injury in the shower the past month with no major injuries.

## 2023-05-26 ENCOUNTER — APPOINTMENT (OUTPATIENT)
Dept: NEUROLOGY | Facility: CLINIC | Age: 43
End: 2023-05-26
Payer: COMMERCIAL

## 2023-05-26 DIAGNOSIS — R20.0 ANESTHESIA OF SKIN: ICD-10-CM

## 2023-05-26 DIAGNOSIS — Z82.3 FAMILY HISTORY OF STROKE: ICD-10-CM

## 2023-05-26 PROCEDURE — 99204 OFFICE O/P NEW MOD 45 MIN: CPT | Mod: 95

## 2023-05-26 NOTE — HISTORY OF PRESENT ILLNESS
[Other Location: e.g. School (Enter Location, City,State)___] : at [unfilled], at the time of the visit. [Medical Office: ([facility])___] : at the medical office located in  [Verbal consent obtained from patient] : the patient, [unfilled] [FreeTextEntry1] : \par On 5/24/23 he noticed numbness in his left hand.\par This was noted at about 10 AM and described as pins and needles which was present from his 4th and 5th digits to just proximal to his elbow. \par In the evening when he was lying in bed with his son he noticed some numbness in his left face and head. He also noted some twitching of his left eye. \par \par He did not have any facial droop or weakness in his left arm.\par \par He also had a pressure sensation in the left side of his head which is persistent. \par He has a persistent feeling of numbness over the area. He describes it as a heavy feeling.\par The headache was not severe.\par The paresthesias in his left arm comes and goes.\par \par He denies history of migraines.\par \par He is currently being treated with antibiotics for a sinus infection/ear infection.\par \par Two months earlier he fell in the shower and hit the left side of his head by his temple.\par He had pain but did not have any nausea or dizziness.\par He still has a stiff neck since that time.\par \par He had lumbar spine surgery with Dr. Lopez in November.\par He did tell him about his fall during his last appointment and was examined.\par \par He went to the ED on 5/24/23. Code stroke was called. \par No acute pathology was seen on CT head, CTA head and neck.\par He has been taking aspirin 81 mg/day since that day.\par

## 2023-05-26 NOTE — DISCUSSION/SUMMARY
[FreeTextEntry1] : Mr. Lee is a 43 year old man who noticed left upper extremity paresthesias beginning on 5/24/23 in the morning. Later in the evening he noted some numbness/tingling in his left face and head. \par Two months earlier he fell in the shower and hit the left side of his head. \par He is also being treated for a sinus infection.\par He was seen in the ED where CT head, CTA head and neck, CT perfusion did not show any acute pathology. There was no evidence of dissection on CTA neck.\par \par Examination is limited as this was a telehealth visit. \par Telehealth visit was done in order to get imaging in a prompt manner since patient was at work when an appointment became available.\par \par -MRI brain and cervical spine to r/o stroke and to investigate for any herniated disc causing a cervical radiculopathy.\par An ulnar neuropathy is also a potential cause of his left arm paresthesias, but would not explain neck pain or facial symptoms.\par \par -Continue aspirin 81 mg/day for now until imaging results are available.\par -He is taking meloxicam for pain. Watch for any signs of increased bruising, bleeding. \par -An office visit will be scheduled as soon as possible.\par \par Return to the ED with any stroke like symptoms.

## 2023-05-26 NOTE — DATA REVIEWED
[de-identified] : CT head 5/24/23: \par No acute intracranial hemorrhage, mass effect, or midline shift.\par \par CTA head and neck, CT perfusion 5/24/23:\par \par No proximal large vessel occlusion, dissection, or high-grade stenosis.\par \par Perfusion imaging demonstrates no evidence of core infarct. 6 cc of \par abnormal decreased perfusion multiplicity region of the inferior anterior \par frontal lobes bilaterally is felt to be artifactual.\par \par

## 2023-05-26 NOTE — CONSULT LETTER
[Dear  ___] : Dear  [unfilled], [Consult Letter:] : I had the pleasure of evaluating your patient, [unfilled]. [Please see my note below.] : Please see my note below. [Consult Closing:] : Thank you very much for allowing me to participate in the care of this patient.  If you have any questions, please do not hesitate to contact me. [FreeTextEntry3] : Sincerely,\par \par \par Layne Grajeda MD\par Diplomate, American Academy of Psychiatry and Neurology\par Board Certified in the Subspecialty of Clinical Neurophysiology\par Board Certified in the Subspecialty of Sleep Medicine\par Board Certified in the Subspecialty of Epilepsy\par

## 2023-05-26 NOTE — PHYSICAL EXAM
[FreeTextEntry1] : Examination:\par Patient is well appearing\par Neurological Examination:\par Mental Status: Awake, alert. Oriented to person, place, time\par Language: No aphasia\par Cranial Nerves:\par  EOMI, smile slightly asymmetric (down on the left) - this is chronic,  tongue protrudes in the midline\par Motor Exam: No pronator drift. Barrel roll intact. Fine motor movements intact in hands\par Sensory: intact on self exam\par Reflexes: Unable to examine\par Cerebellar: Finger to nose intact bilaterally\par \par \par

## 2023-06-16 ENCOUNTER — APPOINTMENT (OUTPATIENT)
Dept: MRI IMAGING | Facility: CLINIC | Age: 43
End: 2023-06-16
Payer: COMMERCIAL

## 2023-06-16 ENCOUNTER — NON-APPOINTMENT (OUTPATIENT)
Age: 43
End: 2023-06-16

## 2023-06-16 ENCOUNTER — OUTPATIENT (OUTPATIENT)
Dept: OUTPATIENT SERVICES | Facility: HOSPITAL | Age: 43
LOS: 1 days | End: 2023-06-16
Payer: COMMERCIAL

## 2023-06-16 DIAGNOSIS — R20.0 ANESTHESIA OF SKIN: ICD-10-CM

## 2023-06-16 PROCEDURE — 72141 MRI NECK SPINE W/O DYE: CPT | Mod: 26

## 2023-06-16 PROCEDURE — 70551 MRI BRAIN STEM W/O DYE: CPT | Mod: 26

## 2023-06-16 PROCEDURE — 70551 MRI BRAIN STEM W/O DYE: CPT

## 2023-06-16 PROCEDURE — 72141 MRI NECK SPINE W/O DYE: CPT

## 2023-06-20 ENCOUNTER — NON-APPOINTMENT (OUTPATIENT)
Age: 43
End: 2023-06-20

## 2023-06-26 ENCOUNTER — NON-APPOINTMENT (OUTPATIENT)
Age: 43
End: 2023-06-26

## 2023-06-28 ENCOUNTER — TRANSCRIPTION ENCOUNTER (OUTPATIENT)
Age: 43
End: 2023-06-28

## 2023-08-10 ENCOUNTER — APPOINTMENT (OUTPATIENT)
Dept: FAMILY MEDICINE | Facility: CLINIC | Age: 43
End: 2023-08-10
Payer: COMMERCIAL

## 2023-08-10 VITALS
OXYGEN SATURATION: 98 % | TEMPERATURE: 97.1 F | HEART RATE: 77 BPM | BODY MASS INDEX: 31.64 KG/M2 | WEIGHT: 226 LBS | HEIGHT: 71 IN | DIASTOLIC BLOOD PRESSURE: 98 MMHG | SYSTOLIC BLOOD PRESSURE: 142 MMHG

## 2023-08-10 PROCEDURE — 99214 OFFICE O/P EST MOD 30 MIN: CPT

## 2023-08-10 RX ORDER — DOXYCYCLINE HYCLATE 100 MG/1
100 CAPSULE ORAL
Qty: 20 | Refills: 0 | Status: DISCONTINUED | COMMUNITY
Start: 2023-05-15 | End: 2023-08-10

## 2023-08-10 NOTE — HISTORY OF PRESENT ILLNESS
[FreeTextEntry8] : Acute care visit Patient reports having elevated blood pressure readings at least 3 times in the past 2 weeks when he went to the hospital for epidurals.  He also started checking his blood pressure at home and last night it was elevated.  Highest blood pressure reading was 140s over 100s.  He is asymptomatic.  Denies any headaches, lightheadedness, chest pressure, pain, palpitations, nausea.

## 2023-08-10 NOTE — HEALTH RISK ASSESSMENT
[0] : 2) Feeling down, depressed, or hopeless: Not at all (0) [PHQ-2 Negative - No further assessment needed] : PHQ-2 Negative - No further assessment needed [Never] : Never [[id number]] : 0

## 2023-09-01 ENCOUNTER — APPOINTMENT (OUTPATIENT)
Dept: OTOLARYNGOLOGY | Facility: CLINIC | Age: 43
End: 2023-09-01
Payer: COMMERCIAL

## 2023-09-01 VITALS — TEMPERATURE: 97.3 F | BODY MASS INDEX: 31.08 KG/M2 | HEIGHT: 71 IN | WEIGHT: 222 LBS

## 2023-09-01 DIAGNOSIS — J34.89 OTHER SPECIFIED DISORDERS OF NOSE AND NASAL SINUSES: ICD-10-CM

## 2023-09-01 DIAGNOSIS — J34.2 DEVIATED NASAL SEPTUM: ICD-10-CM

## 2023-09-01 PROCEDURE — 31231 NASAL ENDOSCOPY DX: CPT

## 2023-09-01 PROCEDURE — 99204 OFFICE O/P NEW MOD 45 MIN: CPT | Mod: 25

## 2023-09-01 NOTE — PHYSICAL EXAM
[Nasal Endoscopy Performed] : nasal endoscopy was performed, see procedure section for findings [Normal] : mucosa is normal [Midline] : trachea located in midline position [de-identified] : lower 1/3 deviation to right, external valve collapse on L

## 2023-09-01 NOTE — ASSESSMENT
[FreeTextEntry1] : 43 year old male presents with decreased breathing from nose and for evaluation of deviated septum. On exam, lower 1/3 deviation to right, external valve collapse on L, horizontal caudal septum fracture, right DNS posteriorly. MRI reviewed: sinuses essentially clear on imaging   Discussed options: 1) continue conservative management with nasal sprays, breathe right strips/cone and antibiotics and abortive techniques for infections 2) valve repair turb reduction and septum procedure - can address sinuses in the future if needed   3) add nose straitening and refinement  -pt says he will think about the options   Risks benefits and alternatives to septonasal reconstruction and bilateral inferior turbinate reduction discussed. Risks of bleeding, infection, septal hematoma, injury to the skull base, septal perforation results in whistling, permanent numbness in and around their nose, pain, discoloration or swelling that may persist, scarring crusting and bleeding as well as continued nasal obstruction, empty nose syndrome, cosmetic deformity, uneven-looking nose, collapse, scarring, synechiae, pollybeak deformity, rocker deformity, bone comminution, and need for revisionary surgery (patient explained that there is inherent revision rate to septorhinoplasty), osteitis, bleeding, infection, recurrent or persistent nasal deformity. Patient understood risks and would like to continue with the operation. harinder Flower, caudal resection and swing vs caudal septal replacement

## 2023-09-01 NOTE — END OF VISIT
[FreeTextEntry3] : I personally saw and examined the patient in detail. I spoke to WHIT Ruelas regarding the assessment and plan of care.  I preformed the procedures and I reviewed the above assessment and plan of care, and agree. I have made changes in changes in the body of the note where appropriate.

## 2023-09-01 NOTE — REVIEW OF SYSTEMS
[Recurrent Sinus Infections] : recurrent sinus infections [Problem Snoring] : problem snoring [Sinus Pressure] : sinus pressure [Negative] : Heme/Lymph [de-identified] : Difficulty breathing while sleeping

## 2023-09-01 NOTE — PROCEDURE
[FreeTextEntry6] : Procedure performed: Nasal Endoscopy- Diagnostic Pre-op indication(s): nasal congestion Post-op indication(s): nasal congestion Verbal and/or written consent obtained from patient Anterior rhinoscopy insufficient to account for symptoms Scope #: 3, flexible fiber optic telescope The scope was introduced in the nasal passage between the middle and inferior turbinates to exam the inferior portion of the middle meatus and the fontanelle, as well as the maxillary ostia. Next, the scope was passed medically and posteriorly to the middle turbinates to examine the sphenoethmoid recess and the superior turbinate region.  Upon visualization the finders are as follows: Nasal Septum: R DNS posteriorly   Bilateral - Mucosa: boggy turbinates, Mucous: scant, Polyp: not seen, Inferior Turbinate: boggy, Middle Turbinate: normal, Superior Turbinate: normal, Inferior Meatus: narrow, Middle Meatus: narrow, Super Meatus: normal, Sphenoethmoidal Recess: clear interior external collapse R, narrow valve, fracture caudal septum, horizontal fracture, vertically  [de-identified] : Procedure performed: laryngeal Endoscopy- Diagnostic Pre-op/post op indication: dysphonia Verbal and/or written consent obtained from patient, Patient was unable to cooperate with mirror Scope #: 3, flexible fiber optic telescope used  Scope was introduced through the nose passed on the floor of the nose to the nasopharynx and then followed down the soft palate to the lower pharynx. The tongue Base, Larynx, Hypopharynx were examined. Base of tongue was symmetric, vallecular was clear, epiglottis was not deformed, subglottis/ pyriform and posterior pharyngeal walls were clear. No erythema, edema, pooling of secretions, masses or lesions. Airway patent, no foreign body visualized. No glottic/supraglottic edema. True vocal cords, arytenoids, vestibular folds, ventricles, pyriform sinuses, and aryepiglottic folds appear normal bilaterally. Vocal cords mobile with good contact b/l moderate fullness, moderate palate

## 2023-09-01 NOTE — HISTORY OF PRESENT ILLNESS
[de-identified] : 43 year old male presents with decreased breathing from nose and for evaluation of deviated septum as referred by Dr Shay Xiao. Patient states he is stuffy and has reduced breathing from the nose which sometimes is completely blocked. Gets intermittent pressure at cheeks and forehead, states gets frequent sinus and ear infections about twice a year requiring antibiotics, sometimes put on steroids. Uses flonase but it doesnt help. Uses netipot which typically causes an ear infection. States they do snore.  Denies environmental allergies, been tested and was negative. Broke nose as a kid.

## 2023-09-01 NOTE — REASON FOR VISIT
[Initial Consultation] : an initial consultation for [FreeTextEntry2] : deviated septum ref by Dr Shay Xiao

## 2023-09-14 ENCOUNTER — RESULT CHARGE (OUTPATIENT)
Age: 43
End: 2023-09-14

## 2023-09-15 ENCOUNTER — MED ADMIN CHARGE (OUTPATIENT)
Age: 43
End: 2023-09-15

## 2023-09-15 ENCOUNTER — APPOINTMENT (OUTPATIENT)
Dept: FAMILY MEDICINE | Facility: CLINIC | Age: 43
End: 2023-09-15
Payer: COMMERCIAL

## 2023-09-15 ENCOUNTER — NON-APPOINTMENT (OUTPATIENT)
Age: 43
End: 2023-09-15

## 2023-09-15 VITALS
BODY MASS INDEX: 31.36 KG/M2 | SYSTOLIC BLOOD PRESSURE: 130 MMHG | HEART RATE: 86 BPM | HEIGHT: 71 IN | DIASTOLIC BLOOD PRESSURE: 86 MMHG | TEMPERATURE: 97.6 F | WEIGHT: 224 LBS | OXYGEN SATURATION: 96 %

## 2023-09-15 VITALS — DIASTOLIC BLOOD PRESSURE: 84 MMHG | SYSTOLIC BLOOD PRESSURE: 130 MMHG

## 2023-09-15 DIAGNOSIS — Z23 ENCOUNTER FOR IMMUNIZATION: ICD-10-CM

## 2023-09-15 DIAGNOSIS — Z00.00 ENCOUNTER FOR GENERAL ADULT MEDICAL EXAMINATION W/OUT ABNORMAL FINDINGS: ICD-10-CM

## 2023-09-15 DIAGNOSIS — M54.12 RADICULOPATHY, CERVICAL REGION: ICD-10-CM

## 2023-09-15 PROCEDURE — 93000 ELECTROCARDIOGRAM COMPLETE: CPT | Mod: 59

## 2023-09-15 PROCEDURE — G0008: CPT

## 2023-09-15 PROCEDURE — 99396 PREV VISIT EST AGE 40-64: CPT | Mod: 25

## 2023-09-15 PROCEDURE — 36415 COLL VENOUS BLD VENIPUNCTURE: CPT

## 2023-09-15 PROCEDURE — 90686 IIV4 VACC NO PRSV 0.5 ML IM: CPT

## 2023-09-18 DIAGNOSIS — E53.8 DEFICIENCY OF OTHER SPECIFIED B GROUP VITAMINS: ICD-10-CM

## 2023-09-18 DIAGNOSIS — E78.00 PURE HYPERCHOLESTEROLEMIA, UNSPECIFIED: ICD-10-CM

## 2023-09-18 LAB
ALBUMIN SERPL ELPH-MCNC: 5 G/DL
ALP BLD-CCNC: 67 U/L
ALT SERPL-CCNC: 29 U/L
ANION GAP SERPL CALC-SCNC: 13 MMOL/L
AST SERPL-CCNC: 27 U/L
BASOPHILS # BLD AUTO: 0.06 K/UL
BASOPHILS NFR BLD AUTO: 0.9 %
BILIRUB SERPL-MCNC: 0.7 MG/DL
BUN SERPL-MCNC: 15 MG/DL
CALCIUM SERPL-MCNC: 9.8 MG/DL
CHLORIDE SERPL-SCNC: 102 MMOL/L
CHOLEST SERPL-MCNC: 214 MG/DL
CO2 SERPL-SCNC: 23 MMOL/L
CREAT SERPL-MCNC: 1 MG/DL
EGFR: 95 ML/MIN/1.73M2
EOSINOPHIL # BLD AUTO: 0.05 K/UL
EOSINOPHIL NFR BLD AUTO: 0.8 %
FOLATE SERPL-MCNC: 18.2 NG/ML
GLUCOSE SERPL-MCNC: 88 MG/DL
HCT VFR BLD CALC: 46 %
HDLC SERPL-MCNC: 66 MG/DL
HGB BLD-MCNC: 15.3 G/DL
IMM GRANULOCYTES NFR BLD AUTO: 0.5 %
LDLC SERPL CALC-MCNC: 132 MG/DL
LYMPHOCYTES # BLD AUTO: 1.67 K/UL
LYMPHOCYTES NFR BLD AUTO: 25.7 %
MAN DIFF?: NORMAL
MCHC RBC-ENTMCNC: 31.2 PG
MCHC RBC-ENTMCNC: 33.3 GM/DL
MCV RBC AUTO: 93.9 FL
MONOCYTES # BLD AUTO: 0.61 K/UL
MONOCYTES NFR BLD AUTO: 9.4 %
NEUTROPHILS # BLD AUTO: 4.07 K/UL
NEUTROPHILS NFR BLD AUTO: 62.7 %
NONHDLC SERPL-MCNC: 147 MG/DL
PLATELET # BLD AUTO: 292 K/UL
POTASSIUM SERPL-SCNC: 4.3 MMOL/L
PROT SERPL-MCNC: 7.3 G/DL
PSA SERPL-MCNC: 0.54 NG/ML
RBC # BLD: 4.9 M/UL
RBC # FLD: 13.5 %
SODIUM SERPL-SCNC: 139 MMOL/L
TRIGL SERPL-MCNC: 88 MG/DL
TSH SERPL-ACNC: 0.74 UIU/ML
VIT B12 SERPL-MCNC: 379 PG/ML
WBC # FLD AUTO: 6.49 K/UL

## 2023-09-18 RX ORDER — CYANOCOBALAMIN (VITAMIN B-12) 1000MCG/15
1000 LIQUID (ML) ORAL DAILY
Qty: 1 | Refills: 3 | Status: ACTIVE | COMMUNITY
Start: 2023-09-18 | End: 1900-01-01
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
## 2023-09-19 DIAGNOSIS — S90.569A INSECT BITE (NONVENOMOUS), UNSPECIFIED ANKLE, INITIAL ENCOUNTER: ICD-10-CM

## 2023-10-12 ENCOUNTER — RX RENEWAL (OUTPATIENT)
Age: 43
End: 2023-10-12

## 2023-11-21 NOTE — ED ADULT TRIAGE NOTE - NSWEIGHTCALCTOOLDRUG_GEN_A_CORE
used [facility] Counseling: I discussed with the patient the risks of tralokinumab including but not limited to eye infection and irritation, cold sores, injection site reactions, worsening of asthma, allergic reactions and increased risk of parasitic infection.  Live vaccines should be avoided while taking tralokinumab. The patient understands that monitoring is required and they must alert us or the primary physician if symptoms of infection or other concerning signs are noted.

## 2023-12-28 ENCOUNTER — RX RENEWAL (OUTPATIENT)
Age: 43
End: 2023-12-28

## 2024-01-04 ENCOUNTER — APPOINTMENT (OUTPATIENT)
Dept: GASTROENTEROLOGY | Facility: CLINIC | Age: 44
End: 2024-01-04

## 2024-03-06 ENCOUNTER — RX RENEWAL (OUTPATIENT)
Age: 44
End: 2024-03-06

## 2024-04-16 ENCOUNTER — APPOINTMENT (OUTPATIENT)
Dept: FAMILY MEDICINE | Facility: CLINIC | Age: 44
End: 2024-04-16
Payer: COMMERCIAL

## 2024-04-16 VITALS
BODY MASS INDEX: 31.36 KG/M2 | OXYGEN SATURATION: 97 % | WEIGHT: 224 LBS | DIASTOLIC BLOOD PRESSURE: 82 MMHG | TEMPERATURE: 97.1 F | HEIGHT: 71 IN | SYSTOLIC BLOOD PRESSURE: 124 MMHG | HEART RATE: 101 BPM

## 2024-04-16 DIAGNOSIS — I10 ESSENTIAL (PRIMARY) HYPERTENSION: ICD-10-CM

## 2024-04-16 DIAGNOSIS — J32.9 CHRONIC SINUSITIS, UNSPECIFIED: ICD-10-CM

## 2024-04-16 DIAGNOSIS — G89.29 LOW BACK PAIN, UNSPECIFIED: ICD-10-CM

## 2024-04-16 DIAGNOSIS — M54.50 LOW BACK PAIN, UNSPECIFIED: ICD-10-CM

## 2024-04-16 PROCEDURE — 99213 OFFICE O/P EST LOW 20 MIN: CPT

## 2024-04-16 RX ORDER — FLUTICASONE PROPIONATE 50 UG/1
50 SPRAY, METERED NASAL DAILY
Qty: 1 | Refills: 11 | Status: ACTIVE | COMMUNITY
Start: 2023-05-15 | End: 1900-01-01

## 2024-04-16 RX ORDER — DOXYCYCLINE HYCLATE 100 MG/1
100 CAPSULE ORAL
Qty: 2 | Refills: 0 | Status: DISCONTINUED | COMMUNITY
Start: 2023-09-19 | End: 2024-04-16

## 2024-04-16 RX ORDER — MELOXICAM 15 MG/1
15 TABLET ORAL DAILY
Qty: 30 | Refills: 2 | Status: ACTIVE | COMMUNITY
Start: 2023-05-26 | End: 1900-01-01

## 2024-04-16 RX ORDER — DOXYCYCLINE HYCLATE 100 MG/1
100 TABLET ORAL TWICE DAILY
Qty: 20 | Refills: 0 | Status: ACTIVE | COMMUNITY
Start: 2024-04-16 | End: 1900-01-01

## 2024-04-16 NOTE — PLAN
[FreeTextEntry1] : Reviewed likely diagnosis of viral URI/early sinus infection and revd usual course for sxs, supportive care measures including fluids, rest, vit C, black elderberry, umcka, zinc, OTC antihistamines (as needed to dry up post nasal drip/discharge etc but avoid these if sxs are localized to sinuses as can make it harder for mucous to drain from sinuses), OTC decongestants if tolerated (to help with congestion/sinus/facial pressure), Tylenol/ibuprofen prn pain, Mucinex (to thin mucous), nasal saline flushes/spray, nasal steroid spray (eg Fluticasone) +/- nasal decongestant spray (eg Afrin) if desired but if using this must limit use to <=3-5 days to avoid rebound nasal/sinus congestion/rhinitis medicamentosa etc.   Rx for Doxy (he is PCN allergic) sent and we revd he should try to defer on using if possible and revd risks of unnecessary antibiotic use; I encouraged him to try to incr supp care measures for a few days at least and see if sxs improved with that before considering adding Abx. Reviewed indications for antibiotic use for sinus infections incl symptoms x >7-10 days without significant improvement, severe facial pain, persistent fevers, double worsening pattern of sxs etc. If needed can fill and take antibiotics and if needs to start these must complete entire course and should consider probiotic use while on antibiotics.  BP goal is <=135/85 on average on home checks. Advised to let us know if BPs are above goal on home checks.   Lifestyle measures to optimize BP reviewed, including regular exercise, adequate sleep, Mediterranean or DASH style clean eating, mindfulness/meditation, magnesium 100-400 mg supplementation, avoid NSAIDS, stress management techniques etc.   RTO if incr/new sxs or if sxs not improving with above measures over the next week.  The following OTC medications are recommended to treat URI/cold symptoms. Patient may use any or all of the following, as clinically indicated by symptoms, as long as not contraindicated by allergy or other medical conditions.   Neti-pot/nasal saline spray-  tsp salt dissolved in warm water in Neti Pot Umcka (pelargonium)- treats cough/cold symptoms Sambucol/Sambucus (black elderberry syrup)- boosts immune system Nasal Steroid Spray (e.g. Fluticasone)- decreases nasal/sinus congestion; okay to use for weeks or months at a time Nasal decongestant spray (e.g. Afrin)- decreases nasal/sinus congestion but can only use short term (ie a few days at most) or else have side effects Decongestant (e.g. Sudafed=PSEUDOEPHEDRINE avail behind the pharmacy counter; NOT phenylephrine avail as Sudafed PE on the shelves as that decongestant is not effective)- decreases nasal/sinus congestion Antihistamine (e.g. Benadryl, Claritin etc.)- decreases runny nose and post nasal drip- NOT for use during acute sinus infections Mucolytic (e.g. Mucinex)- thins mucous to help it drain more easily Tylenol- for fever, pain, headache, aches etc. Ibuprofen (e.g. Advil, Motrin) or Naproxen (e.g. Aleve)- for fever, pain, headache, aches Fluids- thins mucous, keeps you hydrated Zinc- reduces duration and severity of cold symptoms Honey- 1 tsp. to 1 Tbsp. straight off spoon or mixed with tea or hot water- soothes sore throat and quiets cough

## 2024-04-16 NOTE — HEALTH RISK ASSESSMENT
[0] : 2) Feeling down, depressed, or hopeless: Not at all (0) [PHQ-2 Negative - No further assessment needed] : PHQ-2 Negative - No further assessment needed [Never] : Never [[id number]] : 0

## 2024-04-16 NOTE — ASSESSMENT
[FreeTextEntry1] : RAISSA CHEEK is a 44 year old male here for eval of acute sxs consistent with allergies vs early sinus infection

## 2024-04-16 NOTE — REVIEW OF SYSTEMS
[Postnasal Drip] : postnasal drip [Nasal Discharge] : nasal discharge [Headache] : headache [Negative] : Heme/Lymph [Cough] : cough [Earache] : no earache [Sore Throat] : no sore throat [Chest Pain] : no chest pain [Palpitations] : no palpitations [Lower Ext Edema] : no lower extremity edema [Shortness Of Breath] : no shortness of breath [Wheezing] : no wheezing [Dyspnea on Exertion] : not dyspnea on exertion [Dizziness] : no dizziness [FreeTextEntry4] : see HPI [FreeTextEntry6] : +cough related to post nasal drip  [de-identified] : sinus HA

## 2024-04-16 NOTE — HISTORY OF PRESENT ILLNESS
[FreeTextEntry8] : Arpan is a 45 yo man with HTN here for eval of possible sinus infection. Has a deviated nasal septum.   Has had 3+ days of sinus pressure (over B maxillary and frontal sinuses)/pain and sinus HA . +purulent drainage (lime green) out of nose. +post nasal drip. No fevers but does not tend to get this. +cough due to the drip, does not feel like sxs are deep in chest.   Tried a dose of Claritin-D and did not help. Just got Mucinex and took 2 pills and helped a bit. Has Flonase at home and will start using now.   Not usually prone to allergies. Does tend to get sinus infections sometimes. Last time he had one Doxy resolved sxs. Does not usually require oral steroids for sinus infections (sometimes needs for his back issues). Feels he likely needs antibiotic at this point   Has seen ENT Dr. Salgado, incl recently (prior to acute sxs starting). Was advised that deviated nasal septum surgery is not needed. Could consider rhinoplasty of tip of nose to change anatomy to improve breathing.   Has had flare up of back pain/ Neurosurg Dr. Lopez is following him. Had given him rx for Meloxicam and he ran out; only uses occas and not regularly. tolerates it well and is effective nd is requesting RF to keep on hand. Also seeing pain mgmt Dr. Mcdonnell and had recent epidural injection. Back pain is improving with treatments with specialists.

## 2024-04-16 NOTE — PHYSICAL EXAM
[No Acute Distress] : no acute distress [Well Nourished] : well nourished [Well Developed] : well developed [Well-Appearing] : well-appearing [Normal Sclera/Conjunctiva] : normal sclera/conjunctiva [EOMI] : extraocular movements intact [Normal Outer Ear/Nose] : the outer ears and nose were normal in appearance [Normal Oropharynx] : the oropharynx was normal [Normal TMs] : both tympanic membranes were normal [No Lymphadenopathy] : no lymphadenopathy [Supple] : supple [Thyroid Normal, No Nodules] : the thyroid was normal and there were no nodules present [No Respiratory Distress] : no respiratory distress  [No Accessory Muscle Use] : no accessory muscle use [Clear to Auscultation] : lungs were clear to auscultation bilaterally [Normal Rate] : normal rate  [Regular Rhythm] : with a regular rhythm [Normal S1, S2] : normal S1 and S2 [No Murmur] : no murmur heard [No Varicosities] : no varicosities [Pedal Pulses Present] : the pedal pulses are present [No Edema] : there was no peripheral edema [No Extremity Clubbing/Cyanosis] : no extremity clubbing/cyanosis [Soft] : abdomen soft [Non Tender] : non-tender [Non-distended] : non-distended [No Masses] : no abdominal mass palpated [No HSM] : no HSM [Normal Bowel Sounds] : normal bowel sounds [Normal Posterior Cervical Nodes] : no posterior cervical lymphadenopathy [Normal Anterior Cervical Nodes] : no anterior cervical lymphadenopathy [No Joint Swelling] : no joint swelling [Grossly Normal Strength/Tone] : grossly normal strength/tone [No Rash] : no rash [Coordination Grossly Intact] : coordination grossly intact [No Focal Deficits] : no focal deficits [Normal Gait] : normal gait [Normal Affect] : the affect was normal [Normal Insight/Judgement] : insight and judgment were intact [de-identified] : nontoxic appearance, rare/occas cough, speaks in full sentences [de-identified] : +erythematous nasal mucosa with some +yellowish nasal d/c , +deviated nasal septum. No sinus TTP on exam today  [de-identified] : lungs CTA B with regular breathing and with cough, no w/c/r, good AE

## 2024-05-08 ENCOUNTER — RX RENEWAL (OUTPATIENT)
Age: 44
End: 2024-05-08

## 2024-05-08 RX ORDER — LOSARTAN POTASSIUM 50 MG/1
50 TABLET, FILM COATED ORAL
Qty: 90 | Refills: 1 | Status: ACTIVE | COMMUNITY
Start: 2023-08-10 | End: 1900-01-01

## 2024-07-08 ENCOUNTER — RX RENEWAL (OUTPATIENT)
Age: 44
End: 2024-07-08

## 2024-08-16 ENCOUNTER — RESULT REVIEW (OUTPATIENT)
Age: 44
End: 2024-08-16

## 2024-08-16 RX ORDER — SODIUM CHLORIDE 9 MG/ML
3 INJECTION INTRAMUSCULAR; INTRAVENOUS; SUBCUTANEOUS ONCE
Refills: 0 | Status: DISCONTINUED | OUTPATIENT
Start: 2024-08-22 | End: 2024-08-22

## 2024-08-19 PROBLEM — S86.019A STRAIN OF UNSPECIFIED ACHILLES TENDON, INITIAL ENCOUNTER: Chronic | Status: ACTIVE | Noted: 2024-08-16

## 2024-08-19 PROBLEM — M48.00 SPINAL STENOSIS, SITE UNSPECIFIED: Chronic | Status: ACTIVE | Noted: 2024-08-16

## 2024-08-19 PROBLEM — I10 ESSENTIAL (PRIMARY) HYPERTENSION: Chronic | Status: ACTIVE | Noted: 2024-08-16

## 2024-08-20 ENCOUNTER — APPOINTMENT (OUTPATIENT)
Dept: FAMILY MEDICINE | Facility: CLINIC | Age: 44
End: 2024-08-20
Payer: COMMERCIAL

## 2024-08-20 VITALS
SYSTOLIC BLOOD PRESSURE: 122 MMHG | BODY MASS INDEX: 29.82 KG/M2 | WEIGHT: 213 LBS | HEART RATE: 74 BPM | HEIGHT: 71 IN | OXYGEN SATURATION: 98 % | DIASTOLIC BLOOD PRESSURE: 88 MMHG | TEMPERATURE: 97.4 F

## 2024-08-20 DIAGNOSIS — G89.29 LOW BACK PAIN, UNSPECIFIED: ICD-10-CM

## 2024-08-20 DIAGNOSIS — M54.50 LOW BACK PAIN, UNSPECIFIED: ICD-10-CM

## 2024-08-20 DIAGNOSIS — Z01.818 ENCOUNTER FOR OTHER PREPROCEDURAL EXAMINATION: ICD-10-CM

## 2024-08-20 PROCEDURE — 99213 OFFICE O/P EST LOW 20 MIN: CPT

## 2024-08-20 NOTE — PLAN
[FreeTextEntry1] : Based on examination, there are no acute contraindications to proceeding with above listed surgery at this time. Patient was advised to avoid all NSAIDs/ vitamins/ supplements for 1 week prior to surgery. He may resume these post-operatively when advised to do so by his surgeon. Reviewed risks of constipation and DVT post operatively and ways to decrease risk for these issues, including limiting  frequency and duration of opioid medication, increasing  fluid and fiber intake, early and frequent mobilization, and if necessary using Miralax and/or Dulcolax (short term).

## 2024-08-20 NOTE — HISTORY OF PRESENT ILLNESS
[No Pertinent Cardiac History] : no history of aortic stenosis, atrial fibrillation, coronary artery disease, recent myocardial infarction, or implantable device/pacemaker [No Pertinent Pulmonary History] : no history of asthma, COPD, sleep apnea, or smoking [No Adverse Anesthesia Reaction] : no adverse anesthesia reaction in self or family member [(Patient denies any chest pain, claudication, dyspnea on exertion, orthopnea, palpitations or syncope)] : Patient denies any chest pain, claudication, dyspnea on exertion, orthopnea, palpitations or syncope [Chronic Anticoagulation] : no chronic anticoagulation [Chronic Kidney Disease] : no chronic kidney disease [Diabetes] : no diabetes [FreeTextEntry1] : Microdiscectomy  [FreeTextEntry2] : 8/22/24 [FreeTextEntry3] : Dr. Lopez [FreeTextEntry4] : Patient is a 45yo male presenting for a preop clearance for a microdiscectomy scheduled on 8/22 with Dr. Lopez. PMH includes chronic lower back pain, HTN, HLD - PST done on 8/16 - WBC slightly elevated, pt was on a medrol pack at the time, today is the last day

## 2024-08-21 RX ORDER — POVIDONE-IODINE 10 %
1 SOLUTION, NON-ORAL TOPICAL ONCE
Refills: 0 | Status: COMPLETED | OUTPATIENT
Start: 2024-08-22 | End: 2024-08-22

## 2024-08-22 ENCOUNTER — OUTPATIENT (OUTPATIENT)
Dept: OUTPATIENT SERVICES | Facility: HOSPITAL | Age: 44
LOS: 1 days | End: 2024-08-22
Payer: COMMERCIAL

## 2024-08-22 ENCOUNTER — TRANSCRIPTION ENCOUNTER (OUTPATIENT)
Age: 44
End: 2024-08-22

## 2024-08-22 VITALS
SYSTOLIC BLOOD PRESSURE: 146 MMHG | RESPIRATION RATE: 17 BRPM | DIASTOLIC BLOOD PRESSURE: 89 MMHG | OXYGEN SATURATION: 100 % | TEMPERATURE: 97 F | HEART RATE: 78 BPM

## 2024-08-22 VITALS
DIASTOLIC BLOOD PRESSURE: 96 MMHG | RESPIRATION RATE: 16 BRPM | SYSTOLIC BLOOD PRESSURE: 136 MMHG | OXYGEN SATURATION: 98 % | HEIGHT: 71 IN | WEIGHT: 216.05 LBS | HEART RATE: 73 BPM | TEMPERATURE: 97 F

## 2024-08-22 DIAGNOSIS — Z98.890 OTHER SPECIFIED POSTPROCEDURAL STATES: Chronic | ICD-10-CM

## 2024-08-22 DIAGNOSIS — M48.062 SPINAL STENOSIS, LUMBAR REGION WITH NEUROGENIC CLAUDICATION: ICD-10-CM

## 2024-08-22 LAB — ABO RH CONFIRMATION: SIGNIFICANT CHANGE UP

## 2024-08-22 PROCEDURE — C1889: CPT

## 2024-08-22 PROCEDURE — 63035 LAMOT DCMPRN NRV RT EA ADDL: CPT | Mod: RT

## 2024-08-22 PROCEDURE — 63030 LAMOT DCMPRN NRV RT 1 LMBR: CPT | Mod: RT

## 2024-08-22 PROCEDURE — 36415 COLL VENOUS BLD VENIPUNCTURE: CPT

## 2024-08-22 DEVICE — SURGIFOAM PAD 8CM X 12.5CM X 10MM (100): Type: IMPLANTABLE DEVICE | Site: RIGHT | Status: FUNCTIONAL

## 2024-08-22 DEVICE — SURGIFLO MATRIX WITH THROMBIN KIT: Type: IMPLANTABLE DEVICE | Site: RIGHT | Status: FUNCTIONAL

## 2024-08-22 RX ORDER — ACETAMINOPHEN 325 MG/1
975 TABLET ORAL ONCE
Refills: 0 | Status: COMPLETED | OUTPATIENT
Start: 2024-08-22 | End: 2024-08-22

## 2024-08-22 RX ORDER — TRAMADOL HYDROCHLORIDE 200 MG/1
1 TABLET, EXTENDED RELEASE ORAL
Qty: 28 | Refills: 0
Start: 2024-08-22 | End: 2024-08-28

## 2024-08-22 RX ORDER — LOSARTAN POTASSIUM 50 MG/1
50 TABLET ORAL DAILY
Refills: 0 | Status: ACTIVE | OUTPATIENT
Start: 2024-08-22 | End: 2025-07-21

## 2024-08-22 RX ORDER — FENTANYL CITRATE 50 UG/ML
50 INJECTION INTRAMUSCULAR; INTRAVENOUS
Refills: 0 | Status: DISCONTINUED | OUTPATIENT
Start: 2024-08-22 | End: 2024-08-22

## 2024-08-22 RX ORDER — CYCLOBENZAPRINE HCL 10 MG
1 TABLET ORAL
Qty: 21 | Refills: 0
Start: 2024-08-22 | End: 2024-08-28

## 2024-08-22 RX ORDER — VANCOMYCIN/0.9 % SOD CHLORIDE 1.75G/25
1500 PLASTIC BAG, INJECTION (ML) INTRAVENOUS ONCE
Refills: 0 | Status: ACTIVE | OUTPATIENT
Start: 2024-08-22 | End: 2024-08-22

## 2024-08-22 RX ORDER — ONDANSETRON 2 MG/ML
4 INJECTION, SOLUTION INTRAMUSCULAR; INTRAVENOUS ONCE
Refills: 0 | Status: DISCONTINUED | OUTPATIENT
Start: 2024-08-22 | End: 2024-08-22

## 2024-08-22 RX ADMIN — FENTANYL CITRATE 50 MICROGRAM(S): 50 INJECTION INTRAMUSCULAR; INTRAVENOUS at 18:11

## 2024-08-22 RX ADMIN — FENTANYL CITRATE 50 MICROGRAM(S): 50 INJECTION INTRAMUSCULAR; INTRAVENOUS at 18:45

## 2024-08-22 RX ADMIN — Medication 75 MILLILITER(S): at 18:10

## 2024-08-22 RX ADMIN — ACETAMINOPHEN 975 MILLIGRAM(S): 325 TABLET ORAL at 13:01

## 2024-08-22 RX ADMIN — Medication 1 APPLICATION(S): at 13:02

## 2024-08-22 NOTE — ASU DISCHARGE PLAN (ADULT/PEDIATRIC) - ASU DC SPECIAL INSTRUCTIONSFT
Please make an appointment for follow up with neurosurgeon Dr. Sean Lopez' office in 1 week for wound check. You have dissolvable sutures which will not need to be removed. Ok to shower starting today but keep incision site covered with the clear tegaderm dressing until post-op day #3 (3/25/24). On day 3 you may remove the clear dressing and small piece of gauze. Underneath will be small white bandages (steri-strips), do NOT remove these. The steri-strips will fall off on their own or will be removed by Dr. Lopez in 1 week at your follow-up appointment. When showering with the steri-strips on do not scrub incision site and do not submerge in water for prolonged period of time. Gently pat dry after shower.    Medications:  You may use ice packs and over the counter lidocaine patches for mild pain relief   Please take Ibuprofen (Advil, Aleve, etc.) as directed for mild pain (do not exceed 3200mg per day)  The following prescriptions were sent to your pharmacy:  - Tramadol 50mg every 6 hours for MODERATE to SEVERE pain   - Cyclobenzaprine 10 mg every 8 hours as needed for muscle spasms  **These medications can cause drowsiness, please do not operate any heavy machinery if taking these**    NO heavy lifting, strenuous activity, twisting, bending, driving, or working until cleared by your physician.  Return to ER immediately for any of the following: fever, bleeding, new onset numbness/tingling/weakness, nausea and/or vomiting, chest pain, shortness of breath, confusion, seizure, altered mental status, urinary and/or fecal incontinence or retention.

## 2024-08-22 NOTE — ASU DISCHARGE PLAN (ADULT/PEDIATRIC) - CARE PROVIDER_API CALL
Sean Lopez.  Neurosurgery  [address], [address]  [place] [address]  Phone: [phone]  Fax: [phone]  Follow Up Time: 2 weeks

## 2024-08-22 NOTE — PROGRESS NOTE ADULT - ASSESSMENT
44 YOM w/ PMHx HTN who presents for elective R L4-5, L5-S1 laminectomy w/R L4-5 microdiscectomy w/Dr. Lopez. Preop had RLE pain. Surgery proceeded without complications. patient is currently doing well in PACU.    Plan:  - Plan to discharge home to self care  - Pain control: Tramadol, Flexeril sent to patient's home pharmacy  - Pt can shower with dressing on, should remove dressing in 3 days; steri strips to fall off on own  - Pt to follow up with Dr. Lopez in outpatient clinic in 2 weeks  - Case d/w Dr. Lopez

## 2024-08-22 NOTE — PROGRESS NOTE ADULT - SUBJECTIVE AND OBJECTIVE BOX
POST-OPERATIVE NOTE    Procedure: R L4-5, L5-S1 laminectomy, R L4-5 microdiscectomy    Diagnosis/Indication: Right sided L4-5 disc herniation    Surgeon: Dr. Sean Lopez    INTERVAL HPI/ACUTE EVENTS:  44yMale PMHx HTN now s/p R L4-5, L5-S1 laminectomy, R L4-5 microdiscectomy POD#0. Patient seen lying comfortably in bed. Endorsing back pain but states his right leg pain has improved. Pain controlled with medication.    VITALS:  T(C): 36.1 (08-22-24 @ 17:45), Max: 36.3 (08-22-24 @ 13:03)  HR: 72 (08-22-24 @ 18:15) (72 - 86)  BP: 133/95 (08-22-24 @ 18:15) (133/95 - 143/89)  RR: 11 (08-22-24 @ 18:15) (11 - 16)  SpO2: 95% (08-22-24 @ 18:15) (95% - 100%)  Wt(kg): --    PHYSICAL EXAM:  GENERAL: NAD  WOUND: Dressing clean dry intact  GUTIERREZ COMA SCORE: E- V- M- =15  MENTAL STATUS: AAO x3; Awake; Opens eyes spontaneously; Appropriately conversant without aphasia; following simple commands  MOTOR: strength 5/5 b/l upper and lower extremities  SENSATION: grossly intact to light touch all extremities  CHEST/LUNG: Non-labored breathing on room air  SKIN: Warm, dry    LABS:            RADIOLOGY/OTHER: No

## 2024-08-22 NOTE — ASU DISCHARGE PLAN (ADULT/PEDIATRIC) - NS MD DC FALL RISK RISK
For information on Fall & Injury Prevention, visit: [email or web address] OR  [email or web address] OR  https://www.cdc.gov/steadi/patient.html

## 2024-10-01 PROBLEM — R79.89 LOW VITAMIN B12 LEVEL: Status: ACTIVE | Noted: 2023-09-18

## 2024-10-02 ENCOUNTER — NON-APPOINTMENT (OUTPATIENT)
Age: 44
End: 2024-10-02

## 2024-10-02 ENCOUNTER — APPOINTMENT (OUTPATIENT)
Dept: FAMILY MEDICINE | Facility: CLINIC | Age: 44
End: 2024-10-02
Payer: COMMERCIAL

## 2024-10-02 VITALS
BODY MASS INDEX: 30.1 KG/M2 | OXYGEN SATURATION: 97 % | SYSTOLIC BLOOD PRESSURE: 144 MMHG | TEMPERATURE: 97 F | WEIGHT: 215 LBS | HEIGHT: 71 IN | HEART RATE: 75 BPM | DIASTOLIC BLOOD PRESSURE: 100 MMHG

## 2024-10-02 DIAGNOSIS — Z23 ENCOUNTER FOR IMMUNIZATION: ICD-10-CM

## 2024-10-02 DIAGNOSIS — Z00.00 ENCOUNTER FOR GENERAL ADULT MEDICAL EXAMINATION W/OUT ABNORMAL FINDINGS: ICD-10-CM

## 2024-10-02 DIAGNOSIS — M54.50 LOW BACK PAIN, UNSPECIFIED: ICD-10-CM

## 2024-10-02 DIAGNOSIS — I10 ESSENTIAL (PRIMARY) HYPERTENSION: ICD-10-CM

## 2024-10-02 DIAGNOSIS — R79.89 OTHER SPECIFIED ABNORMAL FINDINGS OF BLOOD CHEMISTRY: ICD-10-CM

## 2024-10-02 DIAGNOSIS — G89.29 LOW BACK PAIN, UNSPECIFIED: ICD-10-CM

## 2024-10-02 PROCEDURE — G0008: CPT

## 2024-10-02 PROCEDURE — 90656 IIV3 VACC NO PRSV 0.5 ML IM: CPT

## 2024-10-02 PROCEDURE — 36415 COLL VENOUS BLD VENIPUNCTURE: CPT

## 2024-10-02 PROCEDURE — 99396 PREV VISIT EST AGE 40-64: CPT | Mod: 25

## 2024-10-02 PROCEDURE — 90472 IMMUNIZATION ADMIN EACH ADD: CPT

## 2024-10-02 PROCEDURE — 90715 TDAP VACCINE 7 YRS/> IM: CPT

## 2024-10-02 NOTE — HISTORY OF PRESENT ILLNESS
[FreeTextEntry1] : RAISSA CHEEK is a 44 year old male here for a physical exam. [de-identified] : His last physical exam was last year  Vaccines: Tetanus is NOT up to date, last 10/2014  His last dentist visit was less than one year ago  His last eye doctor appointment was a few years ago His last dermatologist visit was over a year ago, Dr. Jolly  Colon cancer screening is NOT up to date  His diet is healthy overall Exercise: walking, exercise bike  Tarsorrhaphy Text: A tarsorrhaphy was performed using Frost sutures.

## 2024-10-02 NOTE — HEALTH RISK ASSESSMENT
[0] : 2) Feeling down, depressed, or hopeless: Not at all (0) [PHQ-2 Negative - No further assessment needed] : PHQ-2 Negative - No further assessment needed [Never] : Never [[id number]] : 0

## 2024-10-02 NOTE — HEALTH RISK ASSESSMENT
[0] : 2) Feeling down, depressed, or hopeless: Not at all (0) [PHQ-2 Negative - No further assessment needed] : PHQ-2 Negative - No further assessment needed [Never] : Never [[id number]] : 0

## 2024-10-02 NOTE — HISTORY OF PRESENT ILLNESS
[FreeTextEntry1] : RAISSA CHEEK is a 44 year old male here for a physical exam. [de-identified] : His last physical exam was last year  Vaccines: Tetanus is NOT up to date, last 10/2014  His last dentist visit was less than one year ago  His last eye doctor appointment was a few years ago His last dermatologist visit was over a year ago, Dr. Jolly  Colon cancer screening is NOT up to date  His diet is healthy overall Exercise: walking, exercise bike

## 2024-10-02 NOTE — ASSESSMENT
[FreeTextEntry1] : RAISSA CHEEK is a 44 year old male here for a physical exam.  He has a history of hypertension, low back pain, and low vitamin B12.  He had an EKG for preop prior to back surgery in 8/2024 and does not need another one today.  His blood pressure has been elevated at home, around 140/100, for the past few months. It is similar in the office today. He had back surgery about 6 weeks ago so he has been less active. He has lost weight since last year however.

## 2024-10-02 NOTE — PLAN
[FreeTextEntry1] : Will increase Losartan from 50 to 100 mg. He will take two 50 mg tablets daily for now and will contact the office when he needs a renewal so we can prescrbe 100 mg tablets, assuming this dose is effective.  Check labs as above. Will adjust any medications based upon lab results.  Reviewed age-appropriate preventive screening tests with patient. He is due for colon cancer screening. He is due for Tdap and a flu shot. He agreed to all of these today.  Discussed clean eating (eg Mediterranean style eating plan) and regular exercise/staying as physically active as possible.  Include balance exercises and strength training and core strengthening exercises for bone health and to decrease risk for falls.  Reviewed importance of good self care (e.g. meditation, yoga, adequate rest, regular exercise, magnesium, clean eating, etc.).  Follow up for next physical in one year.

## 2024-10-03 ENCOUNTER — TRANSCRIPTION ENCOUNTER (OUTPATIENT)
Age: 44
End: 2024-10-03

## 2024-10-18 ENCOUNTER — TRANSCRIPTION ENCOUNTER (OUTPATIENT)
Age: 44
End: 2024-10-18

## 2024-10-18 RX ORDER — HYDROCHLOROTHIAZIDE 25 MG/1
25 TABLET ORAL
Qty: 30 | Refills: 11 | Status: ACTIVE | COMMUNITY
Start: 2024-10-18 | End: 1900-01-01

## 2024-10-25 ENCOUNTER — TRANSCRIPTION ENCOUNTER (OUTPATIENT)
Age: 44
End: 2024-10-25

## 2024-10-26 ENCOUNTER — TRANSCRIPTION ENCOUNTER (OUTPATIENT)
Age: 44
End: 2024-10-26

## 2024-11-07 ENCOUNTER — RX RENEWAL (OUTPATIENT)
Age: 44
End: 2024-11-07

## 2024-12-18 NOTE — ED STATDOCS - EKG #1 DATE/TIME
in direct care of patient , reviewing labs and other results and adjusting medications and in discussion with other consultants , RN , PA and PMD
in direct care of patient , reviewing labs and other results and adjusting medications and in discussion with other consultants , RN , PA and PMD
22-Apr-2021 18:06

## 2024-12-26 ENCOUNTER — NON-APPOINTMENT (OUTPATIENT)
Age: 44
End: 2024-12-26

## 2025-03-05 NOTE — BRIEF OPERATIVE NOTE - NSICDXBRIEFPROCEDURE_GEN_ALL_CORE_FT
03:56 PM    COLORU Yellow 03/02/2025 03:56 PM    PHUR 6.5 03/02/2025 03:56 PM    PHUR 7.0 05/23/2016 12:18 PM    WBCUA None seen 03/02/2025 03:56 PM    RBCUA None seen 03/02/2025 03:56 PM    BACTERIA 1+ 05/23/2016 12:18 PM    CLARITYU Clear 03/02/2025 03:56 PM    LEUKOCYTESUR Negative 03/02/2025 03:56 PM    UROBILINOGEN 0.2 03/02/2025 03:56 PM    BILIRUBINUR Negative 03/02/2025 03:56 PM    BLOODU TRACE 03/02/2025 03:56 PM    GLUCOSEU Negative 03/02/2025 03:56 PM    KETUA Negative 03/02/2025 03:56 PM     Urine Cultures: No results found for: \"LABURIN\"  Blood Cultures:   Lab Results   Component Value Date/Time    BC  03/02/2025 06:30 PM     No Growth to date.  Any change in status will be called.     Lab Results   Component Value Date/Time    BLOODCULT2  03/02/2025 11:01 PM     No Growth to date.  Any change in status will be called.     Organism: No results found for: \"ORG\"    Time Spent Discharging patient 46 minutes    Electronically signed by Kamilla Haji MD on 3/4/2025 at 9:11 PM     PROCEDURES:  Lumbar laminectomy 22-Aug-2024 17:28:53 R L4-L5, L5-S1 laminectomy w/ L4-L5 microdiscectomy Marleen Hanks

## 2025-03-23 PROBLEM — E78.5 HLD (HYPERLIPIDEMIA): Status: ACTIVE | Noted: 2025-03-23

## 2025-03-23 PROBLEM — R63.5 ABNORMAL WEIGHT GAIN: Status: ACTIVE | Noted: 2025-03-23

## 2025-03-23 PROBLEM — Z13.89 SCREENING FOR MULTIPLE CONDITIONS: Status: ACTIVE | Noted: 2025-03-23

## 2025-03-23 PROBLEM — Z13.6 ENCOUNTER FOR SCREENING FOR CARDIOVASCULAR DISORDERS: Status: ACTIVE | Noted: 2025-03-23

## 2025-03-23 PROBLEM — E66.3 OVERWEIGHT: Status: ACTIVE | Noted: 2025-03-23

## 2025-03-24 ENCOUNTER — APPOINTMENT (OUTPATIENT)
Dept: FAMILY MEDICINE | Facility: CLINIC | Age: 45
End: 2025-03-24
Payer: COMMERCIAL

## 2025-03-24 VITALS
TEMPERATURE: 97.7 F | HEIGHT: 71 IN | HEART RATE: 62 BPM | OXYGEN SATURATION: 98 % | WEIGHT: 228 LBS | BODY MASS INDEX: 31.92 KG/M2 | SYSTOLIC BLOOD PRESSURE: 118 MMHG | DIASTOLIC BLOOD PRESSURE: 82 MMHG

## 2025-03-24 DIAGNOSIS — M77.02 MEDIAL EPICONDYLITIS, LEFT ELBOW: ICD-10-CM

## 2025-03-24 DIAGNOSIS — E66.3 OVERWEIGHT: ICD-10-CM

## 2025-03-24 DIAGNOSIS — R79.89 OTHER SPECIFIED ABNORMAL FINDINGS OF BLOOD CHEMISTRY: ICD-10-CM

## 2025-03-24 DIAGNOSIS — M25.50 PAIN IN UNSPECIFIED JOINT: ICD-10-CM

## 2025-03-24 DIAGNOSIS — I10 ESSENTIAL (PRIMARY) HYPERTENSION: ICD-10-CM

## 2025-03-24 DIAGNOSIS — Z13.6 ENCOUNTER FOR SCREENING FOR CARDIOVASCULAR DISORDERS: ICD-10-CM

## 2025-03-24 DIAGNOSIS — Z82.61 FAMILY HISTORY OF ARTHRITIS: ICD-10-CM

## 2025-03-24 DIAGNOSIS — Z13.89 ENCOUNTER FOR SCREENING FOR OTHER DISORDER: ICD-10-CM

## 2025-03-24 DIAGNOSIS — E78.5 HYPERLIPIDEMIA, UNSPECIFIED: ICD-10-CM

## 2025-03-24 DIAGNOSIS — R63.5 ABNORMAL WEIGHT GAIN: ICD-10-CM

## 2025-03-24 DIAGNOSIS — J34.2 DEVIATED NASAL SEPTUM: ICD-10-CM

## 2025-03-24 PROCEDURE — G0447 BEHAVIOR COUNSEL OBESITY 15M: CPT | Mod: 59

## 2025-03-24 PROCEDURE — 99215 OFFICE O/P EST HI 40 MIN: CPT

## 2025-03-24 PROCEDURE — 36415 COLL VENOUS BLD VENIPUNCTURE: CPT

## 2025-03-24 PROCEDURE — G2211 COMPLEX E/M VISIT ADD ON: CPT | Mod: NC

## 2025-03-24 RX ORDER — LOSARTAN POTASSIUM AND HYDROCHLOROTHIAZIDE 25; 100 MG/1; MG/1
100-25 TABLET ORAL
Qty: 90 | Refills: 3 | Status: ACTIVE | COMMUNITY
Start: 2025-03-24 | End: 1900-01-01

## 2025-03-24 RX ORDER — AZELASTINE HYDROCHLORIDE 137 UG/1
137 SPRAY, METERED NASAL
Qty: 90 | Refills: 3 | Status: ACTIVE | COMMUNITY
Start: 2025-03-24 | End: 1900-01-01

## 2025-03-24 RX ORDER — METFORMIN ER 500 MG 500 MG/1
500 TABLET ORAL
Qty: 90 | Refills: 3 | Status: ACTIVE | COMMUNITY
Start: 2025-03-24 | End: 1900-01-01

## 2025-03-25 LAB
ALBUMIN SERPL ELPH-MCNC: 5.1 G/DL
ALP BLD-CCNC: 74 U/L
ALT SERPL-CCNC: 33 U/L
ANION GAP SERPL CALC-SCNC: 15 MMOL/L
AST SERPL-CCNC: 31 U/L
BILIRUB SERPL-MCNC: 0.8 MG/DL
BUN SERPL-MCNC: 16 MG/DL
CALCIUM SERPL-MCNC: 10.1 MG/DL
CHLORIDE SERPL-SCNC: 100 MMOL/L
CHOLEST SERPL-MCNC: 187 MG/DL
CO2 SERPL-SCNC: 25 MMOL/L
CREAT SERPL-MCNC: 1.09 MG/DL
EGFRCR SERPLBLD CKD-EPI 2021: 85 ML/MIN/1.73M2
GLUCOSE SERPL-MCNC: 94 MG/DL
HDLC SERPL-MCNC: 50 MG/DL
LDLC SERPL-MCNC: 105 MG/DL
NONHDLC SERPL-MCNC: 137 MG/DL
POTASSIUM SERPL-SCNC: 4.5 MMOL/L
PROT SERPL-MCNC: 8 G/DL
SODIUM SERPL-SCNC: 140 MMOL/L
TRIGL SERPL-MCNC: 184 MG/DL

## 2025-04-21 ENCOUNTER — RX RENEWAL (OUTPATIENT)
Age: 45
End: 2025-04-21

## (undated) DEVICE — SUT MONOCRYL 4-0 27" PS-2 UNDYED

## (undated) DEVICE — ELCTR GROUNDING PAD ADULT COVIDIEN

## (undated) DEVICE — POSITIONER JACKSON TABLE HEADREST 7", CHEST, HIP, THIGH PADS, ARM CRADLE

## (undated) DEVICE — DRAPE 3/4 SHEET WITH ADHESIVE 76" X 60"

## (undated) DEVICE — DRAPE TOWEL 1000 SMALL 17" X 11"

## (undated) DEVICE — DRAPE INSTRUMENT POUCH 6.75" X 11"

## (undated) DEVICE — MARKING PEN W RULER

## (undated) DEVICE — POSITIONER FOAM LAMINECTOMY ARM CRADLE (PINK)

## (undated) DEVICE — POSITIONER FOAM EGG CRATE ULNAR 2PCS (PINK)

## (undated) DEVICE — SUT VICRYL 2-0 18" CP-2 UNDYED (POP-OFF)

## (undated) DEVICE — ELCTR ROCKER SWITCH PENCIL BLUE 10FT

## (undated) DEVICE — TUBING FOR SMOKE EVACUATOR (PURPLE END)

## (undated) DEVICE — PACK NEURO

## (undated) DEVICE — SOL IRR POUR H2O 1000ML

## (undated) DEVICE — SOL IRR POUR NS 0.9% 1000ML

## (undated) DEVICE — GLV 7.5 PROTEXIS (WHITE)

## (undated) DEVICE — FRAZIER CONNECTING TUBE 2FT 5MM

## (undated) DEVICE — VENODYNE/SCD SLEEVE CALF MEDIUM

## (undated) DEVICE — DRAPE C ARM UNIVERSAL

## (undated) DEVICE — DRSG TELFA 3 X 4

## (undated) DEVICE — DRAPE 3/4 SHEET 52X76"

## (undated) DEVICE — DRAPE TOWEL BLUE 17" X 24"

## (undated) DEVICE — ELCTR BOVIE PENCIL BLADE 10FT

## (undated) DEVICE — GLV 8 PROTEXIS (WHITE)

## (undated) DEVICE — WARMING BLANKET UPPER ADULT

## (undated) DEVICE — DRAPE XL SHEET 77X98"

## (undated) DEVICE — CATH IV SAFE BC 18G X 1.16" (GREEN)

## (undated) DEVICE — MIDAS REX MR8 MATCH HEAD FLUTED LG BORE 3MM X 14CM